# Patient Record
Sex: FEMALE | Race: ASIAN | NOT HISPANIC OR LATINO | Employment: OTHER | URBAN - METROPOLITAN AREA
[De-identification: names, ages, dates, MRNs, and addresses within clinical notes are randomized per-mention and may not be internally consistent; named-entity substitution may affect disease eponyms.]

---

## 2020-12-22 ENCOUNTER — OFFICE VISIT (OUTPATIENT)
Dept: URGENT CARE | Facility: URGENT CARE | Age: 75
End: 2020-12-22
Payer: MEDICAID

## 2020-12-22 VITALS
WEIGHT: 217 LBS | HEART RATE: 92 BPM | DIASTOLIC BLOOD PRESSURE: 78 MMHG | SYSTOLIC BLOOD PRESSURE: 121 MMHG | RESPIRATION RATE: 24 BRPM | TEMPERATURE: 98.1 F | OXYGEN SATURATION: 100 %

## 2020-12-22 DIAGNOSIS — E11.9 TYPE 2 DIABETES MELLITUS WITHOUT COMPLICATION, WITH LONG-TERM CURRENT USE OF INSULIN (H): ICD-10-CM

## 2020-12-22 DIAGNOSIS — Z79.4 TYPE 2 DIABETES MELLITUS WITHOUT COMPLICATION, WITH LONG-TERM CURRENT USE OF INSULIN (H): ICD-10-CM

## 2020-12-22 DIAGNOSIS — R73.9 ELEVATED BLOOD SUGAR: Primary | ICD-10-CM

## 2020-12-22 LAB — GLUCOSE BLD-MCNC: 279 MG/DL (ref 70–99)

## 2020-12-22 PROCEDURE — 99203 OFFICE O/P NEW LOW 30 MIN: CPT | Performed by: NURSE PRACTITIONER

## 2020-12-22 PROCEDURE — 82947 ASSAY GLUCOSE BLOOD QUANT: CPT | Performed by: NURSE PRACTITIONER

## 2020-12-22 PROCEDURE — 36415 COLL VENOUS BLD VENIPUNCTURE: CPT | Performed by: NURSE PRACTITIONER

## 2020-12-22 RX ORDER — INSULIN GLARGINE 100 [IU]/ML
35 INJECTION, SOLUTION SUBCUTANEOUS DAILY
Qty: 10.5 ML | Refills: 0 | Status: SHIPPED | OUTPATIENT
Start: 2020-12-22 | End: 2021-11-12

## 2020-12-22 RX ORDER — METOPROLOL TARTRATE 50 MG
75 TABLET ORAL 2 TIMES DAILY
COMMUNITY

## 2020-12-22 ASSESSMENT — ENCOUNTER SYMPTOMS
FEVER: 0
SORE THROAT: 0
CHILLS: 0
ROS GI COMMENTS: ELEVATED BLOOD SUGAR
SHORTNESS OF BREATH: 0
DIARRHEA: 0
NAUSEA: 0
VOMITING: 0
RHINORRHEA: 0
HEADACHES: 0
COUGH: 0

## 2020-12-23 NOTE — PROGRESS NOTES
SUBJECTIVE:   Jordan Rosario is a 74 year old female presenting with a chief complaint of   Chief Complaint   Patient presents with     Diabetes     High blood sugar levels, reading of 260 this morning around 9 am. Little dizziiness.       She is a new patient of [facility].    HPI  Jordan Rosario is a 74-year-old female presenting to urgent care with complains of elevated blood sugar levels this morning.  The granddaughter is here with her and reports that in the morning the blood sugar was 260.  She does not know of medications that the patient is using.  She has declined any symptoms.      I am not able to obtain any medical information and the granddaughter does not know where Jordan Rosario goes for treatment.    Review of Systems   Constitutional: Negative for chills and fever.   HENT: Negative for congestion, ear pain, rhinorrhea and sore throat.    Respiratory: Negative for cough and shortness of breath.    Gastrointestinal: Negative for diarrhea, nausea and vomiting.        Elevated blood sugar   Neurological: Negative for headaches.   All other systems reviewed and are negative.      No past medical history on file.  No family history on file.  Current Outpatient Medications   Medication Sig Dispense Refill     insulin glargine (BASAGLAR KWIKPEN) 100 UNIT/ML pen Inject 35 Units Subcutaneous daily 10.5 mL 0     metoprolol tartrate (LOPRESSOR) 50 MG tablet Take 50 mg by mouth 2 times daily       Social History     Tobacco Use     Smoking status: Never Smoker     Smokeless tobacco: Never Used   Substance Use Topics     Alcohol use: Not on file       OBJECTIVE  /78 (BP Location: Left arm, Patient Position: Sitting, Cuff Size: Adult Regular)   Pulse 92   Temp 98.1  F (36.7  C) (Tympanic)   Resp 24   Wt 98.4 kg (217 lb)   SpO2 100%   Breastfeeding No     Physical Exam  HENT:      Right Ear: Tympanic membrane normal.      Left Ear: Tympanic membrane normal.   Eyes:      Conjunctiva/sclera: Conjunctivae normal.    Cardiovascular:      Rate and Rhythm: Normal rate.   Pulmonary:      Effort: Pulmonary effort is normal.   Abdominal:      General: Bowel sounds are normal.      Palpations: Abdomen is soft.   Skin:     General: Skin is warm.   Neurological:      General: No focal deficit present.      Mental Status: She is alert.   Psychiatric:         Mood and Affect: Mood normal.         Labs:  Results for orders placed or performed in visit on 12/22/20 (from the past 24 hour(s))   Glucose, whole blood   Result Value Ref Range    Glucose Whole Blood 279 (H) 70 - 99 mg/dL         ASSESSMENT:      ICD-10-CM    1. Elevated blood sugar  R73.9 Glucose, whole blood     insulin glargine (BASAGLAR KWIKPEN) 100 UNIT/ML pen   2. Type 2 diabetes mellitus without complication, with long-term current use of insulin (H)  E11.9     Z79.4         PLAN:  According to the daughter the patient takes insulin at home.  She does have a paper that has previous prescription of  insulin.  I have advised to take tonight and follow-up with PCP in the next day or to the ER.  Patient educational/instructional material provided including reasons for follow-up    The patient indicates understanding of these issues and agrees with the plan.        Patient Instructions       Patient Education   Diabetes ABCs  Work with Your Health Care Team to Manage Diabetes!     A1c (hemoglobin A1c test):  Check at least every 6 months.  Goal without diabetes: Less than 6%  Goal with diabetes: Less than 7%, but your doctor may have a different goal for you.  My Goal: ___________________   BG (blood glucose):  Goals without diabetes:  Before meals: 60 to 99 mg/dL  After meals (2 hours): under 140 mg/dL  Goals with diabetes:   Before meals: 80 to 130 mg/dL  After meals: (2 hours): under 180 mg/dL  My Goals:  Before meals: __________  After meals: ___________   Blood pressure:  Check at every doctor visit.   Goal: Less than 140/90    Cholesterol:   Check at least 1 time a  "year.  Goals for LDL (\"bad\" cholesterol):  With heart disease: Less than 70 mg/dL  Without heart disease: Less than 100 mg/dL   Eyes:   Have a dilated eye exam every year.   Teeth:   See your dentist twice a year. People with diabetes have a higher risk of gum disease.  Smoking:   If you smoke, it's important to quit. Make a plan with help from your health care team.  Weight:   Work towards a healthy weight with help from your diabetes educator or dietitian.  Kidneys:     Check your urine protein 1 time each year.    Protect your kidneys by keeping your blood pressure normal.  Feet:    Check your feet every day for signs of injury, dry skin, cracks, cuts, swelling, or blisters.    Ask your doctor to check your feet at every visit.    Wear shoes and socks that fit well.    Don't go barefoot.  Sex:   Talk about erectile dysfunction (ED) and female sexual dysfunction (FSD) with your doctor.  For informational purposes only. Not to replace the advice of your health care provider. Copyright   2018 [employer]. All rights reserved. Illustration ID [id number]   [id number]  [employer]. Clinically reviewed by [facility]. [employer] Mobile [id number] - 10/18.       Patient Education   About Insulin Pens and Vials  Insulin vials  You need syringes to inject the insulin. Use a new syringe for each injection. Ask your doctor about the right size syringe for your dose.  Storing vials    Once you start using a vial of insulin, store it at room temperature away from direct light.    After opening, you can only use the vial for a certain amount of time. Refer to the handout for your type of insulin for storage guidelines. Throw the vial away when it passes the storage date, even if there is insulin left in the vial.    Keep unopened vials in the refrigerator. You may use them until the expiration date on the box (also known as the \"use by\" or \"discard\" date).  Your _________________________ insulin is good for " "_____________ days.  Your _________________________ insulin is good for _____________ days.  Your _________________________ insulin is good for _____________ days.   Insulin pens  Some pens come pre-filled with insulin. You may throw these away when the insulin expires or when you run out. Other pens use an insulin cartridge. You may throw away the cartridge, but not the pen.  Pens are made to dial an exact dose of insulin.  You need pen needles to inject the insulin. Use a new needle for each injection.  Storing pens    Once you start using an insulin pen, you may store it at room temperature.    Do not store an insulin pen with a pen needle attached.    Keep unopened pens in the refrigerator. You may use them until the expiration date on the box (also known as the \"use by\" or \"discard\" date).  Disposal  You may throw vials, cartridges and disposable pens in the trash. Throw pen needles and syringes in your sharps container. Your sharps container should:    Seal tightly and stand up straight without tipping.    Hold objects without leaking, breaking, cracking or letting the sharps push through.    Be clearly labeled and easy to dispose. (Call your garbage company for information on disposal.)  For informational purposes only. Not to replace the advice of your health care provider.  Copyright   2007 [employer] [facility]. All rights reserved. KIHEITAI [id number] - REV 03/17.             "

## 2020-12-23 NOTE — PATIENT INSTRUCTIONS
"  Patient Education   Diabetes ABCs  Work with Your Health Care Team to Manage Diabetes!     A1c (hemoglobin A1c test):  Check at least every 6 months.  Goal without diabetes: Less than 6%  Goal with diabetes: Less than 7%, but your doctor may have a different goal for you.  My Goal: ___________________   BG (blood glucose):  Goals without diabetes:  Before meals: 60 to 99 mg/dL  After meals (2 hours): under 140 mg/dL  Goals with diabetes:   Before meals: 80 to 130 mg/dL  After meals: (2 hours): under 180 mg/dL  My Goals:  Before meals: __________  After meals: ___________   Blood pressure:  Check at every doctor visit.   Goal: Less than 140/90    Cholesterol:   Check at least 1 time a year.  Goals for LDL (\"bad\" cholesterol):  With heart disease: Less than 70 mg/dL  Without heart disease: Less than 100 mg/dL   Eyes:   Have a dilated eye exam every year.   Teeth:   See your dentist twice a year. People with diabetes have a higher risk of gum disease.  Smoking:   If you smoke, it's important to quit. Make a plan with help from your health care team.  Weight:   Work towards a healthy weight with help from your diabetes educator or dietitian.  Kidneys:     Check your urine protein 1 time each year.    Protect your kidneys by keeping your blood pressure normal.  Feet:    Check your feet every day for signs of injury, dry skin, cracks, cuts, swelling, or blisters.    Ask your doctor to check your feet at every visit.    Wear shoes and socks that fit well.    Don't go barefoot.  Sex:   Talk about erectile dysfunction (ED) and female sexual dysfunction (FSD) with your doctor.  For informational purposes only. Not to replace the advice of your health care provider. Copyright   2018 [employer]. All rights reserved. Illustration ID [id number]   [id number]  [employer]. Clinically reviewed by [facility]. [employer] [id number] - 10/18.       Patient Education   About Insulin Pens and Vials  Insulin " "vials  You need syringes to inject the insulin. Use a new syringe for each injection. Ask your doctor about the right size syringe for your dose.  Storing vials    Once you start using a vial of insulin, store it at room temperature away from direct light.    After opening, you can only use the vial for a certain amount of time. Refer to the handout for your type of insulin for storage guidelines. Throw the vial away when it passes the storage date, even if there is insulin left in the vial.    Keep unopened vials in the refrigerator. You may use them until the expiration date on the box (also known as the \"use by\" or \"discard\" date).  Your _________________________ insulin is good for _____________ days.  Your _________________________ insulin is good for _____________ days.  Your _________________________ insulin is good for _____________ days.   Insulin pens  Some pens come pre-filled with insulin. You may throw these away when the insulin expires or when you run out. Other pens use an insulin cartridge. You may throw away the cartridge, but not the pen.  Pens are made to dial an exact dose of insulin.  You need pen needles to inject the insulin. Use a new needle for each injection.  Storing pens    Once you start using an insulin pen, you may store it at room temperature.    Do not store an insulin pen with a pen needle attached.    Keep unopened pens in the refrigerator. You may use them until the expiration date on the box (also known as the \"use by\" or \"discard\" date).  Disposal  You may throw vials, cartridges and disposable pens in the trash. Throw pen needles and syringes in your sharps container. Your sharps container should:    Seal tightly and stand up straight without tipping.    Hold objects without leaking, breaking, cracking or letting the sharps push through.    Be clearly labeled and easy to dispose. (Call your garbage company for information on disposal.)  For informational purposes only. Not to " replace the advice of your health care provider.  Copyright   2007 [employer] [facility]. All rights reserved. [employer] [id number] - REV 03/17.

## 2021-04-29 ENCOUNTER — RECORDS - HEALTHEAST (OUTPATIENT)
Dept: SCHEDULING | Facility: CLINIC | Age: 76
End: 2021-04-29

## 2021-04-29 ENCOUNTER — RECORDS - HEALTHEAST (OUTPATIENT)
Dept: ADMINISTRATIVE | Facility: OTHER | Age: 76
End: 2021-04-29

## 2021-04-29 DIAGNOSIS — Z91.89 OTHER SPECIFIED PERSONAL RISK FACTORS, NOT ELSEWHERE CLASSIFIED: ICD-10-CM

## 2021-04-29 DIAGNOSIS — Z91.89 AT RISK FOR ASPIRATION: ICD-10-CM

## 2021-09-27 ENCOUNTER — OFFICE VISIT (OUTPATIENT)
Dept: CARDIOLOGY | Facility: CLINIC | Age: 76
End: 2021-09-27
Payer: COMMERCIAL

## 2021-09-27 ENCOUNTER — APPOINTMENT (OUTPATIENT)
Dept: INTERPRETER SERVICES | Facility: CLINIC | Age: 76
End: 2021-09-27
Payer: COMMERCIAL

## 2021-09-27 VITALS — RESPIRATION RATE: 28 BRPM | HEART RATE: 94 BPM | DIASTOLIC BLOOD PRESSURE: 90 MMHG | SYSTOLIC BLOOD PRESSURE: 150 MMHG

## 2021-09-27 DIAGNOSIS — I25.10 CORONARY ARTERY DISEASE INVOLVING NATIVE CORONARY ARTERY OF NATIVE HEART, ANGINA PRESENCE UNSPECIFIED: ICD-10-CM

## 2021-09-27 DIAGNOSIS — Z95.0 CARDIAC PACEMAKER IN SITU: ICD-10-CM

## 2021-09-27 DIAGNOSIS — I50.22 CHRONIC SYSTOLIC HEART FAILURE (H): Primary | ICD-10-CM

## 2021-09-27 DIAGNOSIS — N18.32 STAGE 3B CHRONIC KIDNEY DISEASE (H): ICD-10-CM

## 2021-09-27 DIAGNOSIS — I48.20 CHRONIC ATRIAL FIBRILLATION (H): ICD-10-CM

## 2021-09-27 DIAGNOSIS — R06.09 DOE (DYSPNEA ON EXERTION): ICD-10-CM

## 2021-09-27 LAB
ANION GAP SERPL CALCULATED.3IONS-SCNC: 13 MMOL/L (ref 5–18)
BUN SERPL-MCNC: 43 MG/DL (ref 8–28)
CALCIUM SERPL-MCNC: 10.1 MG/DL (ref 8.5–10.5)
CHLORIDE BLD-SCNC: 94 MMOL/L (ref 98–107)
CO2 SERPL-SCNC: 22 MMOL/L (ref 22–31)
CREAT SERPL-MCNC: 1.48 MG/DL (ref 0.6–1.1)
GFR SERPL CREATININE-BSD FRML MDRD: 34 ML/MIN/1.73M2
GLUCOSE BLD-MCNC: 246 MG/DL (ref 70–125)
POTASSIUM BLD-SCNC: 3.9 MMOL/L (ref 3.5–5)
SODIUM SERPL-SCNC: 129 MMOL/L (ref 136–145)

## 2021-09-27 PROCEDURE — 80048 BASIC METABOLIC PNL TOTAL CA: CPT | Performed by: INTERNAL MEDICINE

## 2021-09-27 PROCEDURE — 83880 ASSAY OF NATRIURETIC PEPTIDE: CPT | Performed by: INTERNAL MEDICINE

## 2021-09-27 PROCEDURE — 99205 OFFICE O/P NEW HI 60 MIN: CPT | Performed by: INTERNAL MEDICINE

## 2021-09-27 PROCEDURE — 36415 COLL VENOUS BLD VENIPUNCTURE: CPT | Performed by: INTERNAL MEDICINE

## 2021-09-27 RX ORDER — INSULIN ASPART 100 [IU]/ML
INJECTION, SOLUTION INTRAVENOUS; SUBCUTANEOUS DAILY
COMMUNITY
Start: 2021-04-22

## 2021-09-27 RX ORDER — ACETAMINOPHEN 500 MG
TABLET ORAL DAILY PRN
COMMUNITY

## 2021-09-27 RX ORDER — BUMETANIDE 1 MG/1
1 TABLET ORAL DAILY
COMMUNITY

## 2021-09-27 RX ORDER — PREDNISONE 10 MG/1
TABLET ORAL DAILY
COMMUNITY
Start: 2021-09-03

## 2021-09-27 RX ORDER — WARFARIN SODIUM 2.5 MG/1
TABLET ORAL DAILY
COMMUNITY
Start: 2021-07-16

## 2021-09-27 RX ORDER — GLIPIZIDE 5 MG/1
5 TABLET ORAL DAILY
COMMUNITY
Start: 2021-07-15

## 2021-09-27 NOTE — PATIENT INSTRUCTIONS
1. Resume bumetanide 1 mg daily to keep fluid levels down.  2. Increase metoprolol tartrate to 1.5 tablets twice daily.  3. Check labs today  4. Set up pacemaker check

## 2021-09-27 NOTE — PROGRESS NOTES
Thank you, Dr. Pascual Kolb, for asking the [facility] team to see Ms. Jordan Rosario in the rapid access clinic to evaluate pacemaker.    Assessment/Recommendations   Assessment:    1.  Pacemaker in situ, details unknown but according to her son who she is now living with, it was implanted approximately 8 years ago in [place].  She does not carry a card that identifies the maker of the device.  He will try to get that information from his brother with whom she lived with in [place].  In the meantime, we will plan to set up a pacemaker check in our device clinic.  2.  Chronic systolic heart failure, likely secondary to underlying coronary artery disease.  Patient's son reports she had a myocardial infarction years ago but does not recall that she underwent stent placement or bypass surgery.  He does know that her heart function is weak.  She did have an echocardiogram performed in the last several months demonstrating moderate left ventricular systolic dysfunction with an ejection fraction of approximately 35% which appeared global along with mitral insufficiency.  Denies any current symptoms of chest tightness.  Does report exertional dyspnea.  Will check BNP and renal function.  I also note that she is not on bumetanide 1 mg daily which was on the medication list by her primary earlier this month.  Reiterated the importance that they restart this medication to help minimize fluid buildup in her lungs.  3.  Chronic atrial fibrillation with mildly rapid ventricular response.  Again in reviewing her medications, I note she is taking only 50 mg of metoprolol twice daily rather than the 75 mg twice daily.  Instructed her son to increase her medication appropriately.  She was restarted on warfarin anticoagulation by primary physician and will continue to follow there for her INRs.  4.  Coronary artery disease.  Again no formal records available but according to her son, she did sustain a heart  attack about 8 years ago which left her heart week.  Denies any current symptoms of chest tightness.    Plan:  1.  Restart bumetanide 1 mg daily as previously directed  2.  Increase metoprolol tartrate to 75 mg or 1.5 tablets twice daily  3.  Schedule visit in the pacemaker clinic.  We will attempt to get records of her pacemaker brand.  4.  Laboratory studies today including BNP and basic metabolic profile       History of Present Illness    Ms. Jordan Rosario is a 75 year old female with reported history of coronary artery disease, chronic left ventricular systolic dysfunction, chronic atrial fibrillation, status post permanent pacemaker insertion for unclear reasons but suspect bradycardia who is establishing cardiac care here in [place].  She had previously resided with the son in [place] and is now living with a son in [place].  According to her son here, his brother moved around quite a bit over the last 10 years and he does not know where his mother had her cardiac care or her pacemaker implanted.  He states she does not carry a card with the name of the pacemaker company and he has no knowledge of what type of pacemaker it is.  He states that she had a heart attack about 8 years ago that left her heart weak and thus the reason for her pacemaker.  It is not clear whether she has a pacemaker or AICD although she has never received any shocks from the device.  She was hospitalized in the spring for mild heart failure exacerbation due to a urinary tract infection.  Was transferred to a transitional care facility and then home.  When she was seen by her primary physician earlier this month, it appeared she was not taking her Bumex or her warfarin.  Both were restarted although the son never picked up the prescription for the Bumex.  He does report that his mother gets short of breath with minimal activity.  Also appears short of breath when laying down in bed.  No clear history of PND or lower extremity  edema.    ECG (personally reviewed): No ECG today    Cardiac Imaging Studies (personally reviewed): Recent echocardiogram demonstrated moderate to moderately severe global left ventricular dysfunction with estimated ejection fraction of 30 to 35%, severe left atrial enlargement, moderate to severe mitral insufficiency which is directed posteriorly, right ventricular enlargement with moderately reduced systolic function and severe tricuspid insufficiency with estimated pulmonary artery pressures of 29 mmHg plus mean right atrial pressure.       Physical Examination Review of Systems   BP (!) 150/90 (BP Location: Right arm, Patient Position: Sitting, Cuff Size: Adult Large)   Pulse 94   Resp 28   There is no height or weight on file to calculate BMI.  Wt Readings from Last 3 Encounters:   12/22/20 98.4 kg (217 lb)     General Appearance:   Awake, Alert, No acute distress.   HEENT:  No scleral icterus; the mucous membranes were pink and moist.   Neck: No cervical bruits or jugular venous distention    Chest: The spine was straight. The chest was symmetric.   Lungs:   Respirations unlabored; the lungs are clear to auscultation although slightly diminished at the right base. No wheezing or rhonchi   Cardiovascular:    Irregularly irregular rhythm.  S1, S2 normal.  2/6 holosystolic murmur heard at the lower left sternal border and into the axilla   Abdomen:  No organomegaly, masses, bruits, or tenderness. Bowels sounds are present   Extremities:  No peripheral edema bilaterally.   Skin: No xanthelasma. Warm, Dry.   Musculoskeletal: No tenderness.   Neurologic: Mood and affect are appropriate.    Enc Vitals  BP: (!) 150/90  Pulse: 94  Resp: 28  Weight:  (unable to obtain due to patient not being unsteady on her fe)                                         Medical History  Surgical History Family History Social History   No past medical history on file. No past surgical history on file. No family history on file. Social  History     Socioeconomic History     Marital status:      Spouse name: Not on file     Number of children: Not on file     Years of education: Not on file     Highest education level: Not on file   Occupational History     Not on file   Tobacco Use     Smoking status: Never Smoker     Smokeless tobacco: Never Used   Substance and Sexual Activity     Alcohol use: Not on file     Drug use: Not on file     Sexual activity: Not on file   Other Topics Concern     Not on file   Social History Narrative     Not on file     Social Determinants of Health     Financial Resource Strain:      Difficulty of Paying Living Expenses:    Food Insecurity:      Worried About Running Out of Food in the Last Year:      Ran Out of Food in the Last Year:    Transportation Needs:      Lack of Transportation (Medical):      Lack of Transportation (Non-Medical):    Physical Activity:      Days of Exercise per Week:      Minutes of Exercise per Session:    Stress:      Feeling of Stress :    Social Connections:      Frequency of Communication with Friends and Family:      Frequency of Social Gatherings with Friends and Family:      Attends Temple Services:      Active Member of Clubs or Organizations:      Attends Club or Organization Meetings:      Marital Status:    Intimate Partner Violence:      Fear of Current or Ex-Partner:      Emotionally Abused:      Physically Abused:      Sexually Abused:           Medications  Allergies   Current Outpatient Medications   Medication Sig Dispense Refill     acetaminophen (TYLENOL) 500 MG tablet daily as needed       bumetanide (BUMEX) 1 MG tablet Take 1 mg by mouth daily       diclofenac (VOLTAREN) 1 % topical gel daily as needed       glipiZIDE (GLUCOTROL) 5 MG tablet daily       metFORMIN (GLUCOPHAGE) 1000 MG tablet daily (with breakfast)       metoprolol tartrate (LOPRESSOR) 50 MG tablet Take 75 mg by mouth 2 times daily Take 1.5 tablets twice daily       NOVOLOG FLEXPEN 100 UNIT/ML  soln daily       predniSONE (DELTASONE) 10 MG tablet daily       warfarin ANTICOAGULANT (COUMADIN) 2.5 MG tablet daily       insulin glargine (BASAGLAR KWIKPEN) 100 UNIT/ML pen Inject 35 Units Subcutaneous daily 10.5 mL 0      No Known Allergies      Lab Results    Chemistry/lipid CBC Cardiac Enzymes/BNP/TSH/INR   No results for input(s): TRIG, LDL, CREATININE, BUN, NA, CO2 in the last 66877 hours.    Invalid input(s): TOTALCHOL, CHOLHDL, LDLCALC, K, CL No results for input(s): WBC, HGB, HCT, MCV, PLT in the last 08495 hours. No results for input(s): CKTOTAL, CKMB, TROPONINI, BNP, TSH, INR in the last 23853 hours.    Invalid input(s): CKMBINDEX     A total of 75 minutes was spent reviewing patient's medical records, obtaining history and performing examination, as well as discussing diagnoses/ recommendations with patient and answering all questions.

## 2021-09-27 NOTE — LETTER
9/27/2021    DIANE KOLB MD  [facility] [address]  [facility] [address]    RE: Jordan Rosario       Dear Colleague,    I had the pleasure of seeing Jordan Rosario in the [facility].        Thank you, Dr. Diane Kolb, for asking the [facility] team to see Ms. Jordan Rosario in the rapid access clinic to evaluate pacemaker.    Assessment/Recommendations   Assessment:    1.  Pacemaker in situ, details unknown but according to her son who she is now living with, it was implanted approximately 8 years ago in [place].  She does not carry a card that identifies the maker of the device.  He will try to get that information from his brother with whom she lived with in [place].  In the meantime, we will plan to set up a pacemaker check in our device clinic.  2.  Chronic systolic heart failure, likely secondary to underlying coronary artery disease.  Patient's son reports she had a myocardial infarction years ago but does not recall that she underwent stent placement or bypass surgery.  He does know that her heart function is weak.  She did have an echocardiogram performed in the last several months demonstrating moderate left ventricular systolic dysfunction with an ejection fraction of approximately 35% which appeared global along with mitral insufficiency.  Denies any current symptoms of chest tightness.  Does report exertional dyspnea.  Will check BNP and renal function.  I also note that she is not on bumetanide 1 mg daily which was on the medication list by her primary earlier this month.  Reiterated the importance that they restart this medication to help minimize fluid buildup in her lungs.  3.  Chronic atrial fibrillation with mildly rapid ventricular response.  Again in reviewing her medications, I note she is taking only 50 mg of metoprolol twice daily rather than the 75 mg twice daily.  Instructed her son to increase her medication  appropriately.  She was restarted on warfarin anticoagulation by primary physician and will continue to follow there for her INRs.  4.  Coronary artery disease.  Again no formal records available but according to her son, she did sustain a heart attack about 8 years ago which left her heart week.  Denies any current symptoms of chest tightness.    Plan:  1.  Restart bumetanide 1 mg daily as previously directed  2.  Increase metoprolol tartrate to 75 mg or 1.5 tablets twice daily  3.  Schedule visit in the pacemaker clinic.  We will attempt to get records of her pacemaker brand.  4.  Laboratory studies today including BNP and basic metabolic profile       History of Present Illness    Ms. Jordan Rosario is a 75 year old female with reported history of coronary artery disease, chronic left ventricular systolic dysfunction, chronic atrial fibrillation, status post permanent pacemaker insertion for unclear reasons but suspect bradycardia who is establishing cardiac care here in [place].  She had previously resided with the son in [place] and is now living with a son in [place].  According to her son here, his brother moved around quite a bit over the last 10 years and he does not know where his mother had her cardiac care or her pacemaker implanted.  He states she does not carry a card with the name of the pacemaker company and he has no knowledge of what type of pacemaker it is.  He states that she had a heart attack about 8 years ago that left her heart weak and thus the reason for her pacemaker.  It is not clear whether she has a pacemaker or AICD although she has never received any shocks from the device.  She was hospitalized in the spring for mild heart failure exacerbation due to a urinary tract infection.  Was transferred to a transitional care facility and then home.  When she was seen by her primary physician earlier this month, it appeared she was not taking her Bumex or her warfarin.  Both were restarted  although the son never picked up the prescription for the Bumex.  He does report that his mother gets short of breath with minimal activity.  Also appears short of breath when laying down in bed.  No clear history of PND or lower extremity edema.    ECG (personally reviewed): No ECG today    Cardiac Imaging Studies (personally reviewed): Recent echocardiogram demonstrated moderate to moderately severe global left ventricular dysfunction with estimated ejection fraction of 30 to 35%, severe left atrial enlargement, moderate to severe mitral insufficiency which is directed posteriorly, right ventricular enlargement with moderately reduced systolic function and severe tricuspid insufficiency with estimated pulmonary artery pressures of 29 mmHg plus mean right atrial pressure.       Physical Examination Review of Systems   BP (!) 150/90 (BP Location: Right arm, Patient Position: Sitting, Cuff Size: Adult Large)   Pulse 94   Resp 28   There is no height or weight on file to calculate BMI.  Wt Readings from Last 3 Encounters:   12/22/20 98.4 kg (217 lb)     General Appearance:   Awake, Alert, No acute distress.   HEENT:  No scleral icterus; the mucous membranes were pink and moist.   Neck: No cervical bruits or jugular venous distention    Chest: The spine was straight. The chest was symmetric.   Lungs:   Respirations unlabored; the lungs are clear to auscultation although slightly diminished at the right base. No wheezing or rhonchi   Cardiovascular:    Irregularly irregular rhythm.  S1, S2 normal.  2/6 holosystolic murmur heard at the lower left sternal border and into the axilla   Abdomen:  No organomegaly, masses, bruits, or tenderness. Bowels sounds are present   Extremities:  No peripheral edema bilaterally.   Skin: No xanthelasma. Warm, Dry.   Musculoskeletal: No tenderness.   Neurologic: Mood and affect are appropriate.    Enc Vitals  BP: (!) 150/90  Pulse: 94  Resp: 28  Weight:  (unable to obtain due to patient  not being unsteady on her fe)                                         Medical History  Surgical History Family History Social History   No past medical history on file. No past surgical history on file. No family history on file. Social History     Socioeconomic History     Marital status:      Spouse name: Not on file     Number of children: Not on file     Years of education: Not on file     Highest education level: Not on file   Occupational History     Not on file   Tobacco Use     Smoking status: Never Smoker     Smokeless tobacco: Never Used   Substance and Sexual Activity     Alcohol use: Not on file     Drug use: Not on file     Sexual activity: Not on file   Other Topics Concern     Not on file   Social History Narrative     Not on file     Social Determinants of Health     Financial Resource Strain:      Difficulty of Paying Living Expenses:    Food Insecurity:      Worried About Running Out of Food in the Last Year:      Ran Out of Food in the Last Year:    Transportation Needs:      Lack of Transportation (Medical):      Lack of Transportation (Non-Medical):    Physical Activity:      Days of Exercise per Week:      Minutes of Exercise per Session:    Stress:      Feeling of Stress :    Social Connections:      Frequency of Communication with Friends and Family:      Frequency of Social Gatherings with Friends and Family:      Attends Hinduism Services:      Active Member of Clubs or Organizations:      Attends Club or Organization Meetings:      Marital Status:    Intimate Partner Violence:      Fear of Current or Ex-Partner:      Emotionally Abused:      Physically Abused:      Sexually Abused:           Medications  Allergies   Current Outpatient Medications   Medication Sig Dispense Refill     acetaminophen (TYLENOL) 500 MG tablet daily as needed       bumetanide (BUMEX) 1 MG tablet Take 1 mg by mouth daily       diclofenac (VOLTAREN) 1 % topical gel daily as needed       glipiZIDE  (GLUCOTROL) 5 MG tablet daily       metFORMIN (GLUCOPHAGE) 1000 MG tablet daily (with breakfast)       metoprolol tartrate (LOPRESSOR) 50 MG tablet Take 75 mg by mouth 2 times daily Take 1.5 tablets twice daily       NOVOLOG FLEXPEN 100 UNIT/ML soln daily       predniSONE (DELTASONE) 10 MG tablet daily       warfarin ANTICOAGULANT (COUMADIN) 2.5 MG tablet daily       insulin glargine (BASAGLAR KWIKPEN) 100 UNIT/ML pen Inject 35 Units Subcutaneous daily 10.5 mL 0      No Known Allergies      Lab Results    Chemistry/lipid CBC Cardiac Enzymes/BNP/TSH/INR   No results for input(s): TRIG, LDL, CREATININE, BUN, NA, CO2 in the last 05682 hours.    Invalid input(s): TOTALCHOL, CHOLHDL, LDLCALC, K, CL No results for input(s): WBC, HGB, HCT, MCV, PLT in the last 25367 hours. No results for input(s): CKTOTAL, CKMB, TROPONINI, BNP, TSH, INR in the last 16728 hours.    Invalid input(s): CKMBINDEX     A total of 75 minutes was spent reviewing patient's medical records, obtaining history and performing examination, as well as discussing diagnoses/ recommendations with patient and answering all questions.                          Thank you for allowing me to participate in the care of your patient.      Sincerely,     Patricia Bobo MD     [facility]  cc:   Pascual Kolb MD  [facility] [place] [place] [facility]  [address] [place] [address]

## 2021-09-29 LAB — NT-PROBNP SERPL-MCNC: 9514 PG/ML (ref 0–450)

## 2021-10-01 ENCOUNTER — ANCILLARY PROCEDURE (OUTPATIENT)
Dept: CARDIOLOGY | Facility: CLINIC | Age: 76
End: 2021-10-01
Attending: INTERNAL MEDICINE
Payer: COMMERCIAL

## 2021-10-01 VITALS
DIASTOLIC BLOOD PRESSURE: 62 MMHG | SYSTOLIC BLOOD PRESSURE: 124 MMHG | RESPIRATION RATE: 16 BRPM | WEIGHT: 119 LBS | HEART RATE: 84 BPM

## 2021-10-01 DIAGNOSIS — Z95.0 PACEMAKER: ICD-10-CM

## 2021-10-01 LAB
MDC_IDC_LEAD_IMPLANT_DT: NORMAL
MDC_IDC_LEAD_IMPLANT_DT: NORMAL
MDC_IDC_LEAD_LOCATION: NORMAL
MDC_IDC_LEAD_LOCATION: NORMAL
MDC_IDC_LEAD_LOCATION_DETAIL_1: NORMAL
MDC_IDC_LEAD_LOCATION_DETAIL_1: NORMAL
MDC_IDC_LEAD_MFG: NORMAL
MDC_IDC_LEAD_MFG: NORMAL
MDC_IDC_LEAD_MODEL: NORMAL
MDC_IDC_LEAD_MODEL: NORMAL
MDC_IDC_LEAD_POLARITY_TYPE: NORMAL
MDC_IDC_LEAD_POLARITY_TYPE: NORMAL
MDC_IDC_LEAD_SERIAL: NORMAL
MDC_IDC_LEAD_SERIAL: NORMAL
MDC_IDC_MSMT_BATTERY_DTM: NORMAL
MDC_IDC_MSMT_BATTERY_IMPEDANCE: 382 OHM
MDC_IDC_MSMT_BATTERY_REMAINING_LONGEVITY: 105 MO
MDC_IDC_MSMT_BATTERY_STATUS: NORMAL
MDC_IDC_MSMT_BATTERY_VOLTAGE: 2.77 V
MDC_IDC_MSMT_LEADCHNL_RA_IMPEDANCE_VALUE: 522 OHM
MDC_IDC_MSMT_LEADCHNL_RA_SENSING_INTR_AMPL: 0.7 MV
MDC_IDC_MSMT_LEADCHNL_RV_IMPEDANCE_VALUE: 457 OHM
MDC_IDC_MSMT_LEADCHNL_RV_PACING_THRESHOLD_AMPLITUDE: 0.62 V
MDC_IDC_MSMT_LEADCHNL_RV_PACING_THRESHOLD_AMPLITUDE: 0.75 V
MDC_IDC_MSMT_LEADCHNL_RV_PACING_THRESHOLD_PULSEWIDTH: 0.4 MS
MDC_IDC_MSMT_LEADCHNL_RV_PACING_THRESHOLD_PULSEWIDTH: 0.4 MS
MDC_IDC_MSMT_LEADCHNL_RV_SENSING_INTR_AMPL: 4 MV
MDC_IDC_PG_IMPLANT_DTM: NORMAL
MDC_IDC_PG_MFG: NORMAL
MDC_IDC_PG_MODEL: NORMAL
MDC_IDC_PG_SERIAL: NORMAL
MDC_IDC_PG_TYPE: NORMAL
MDC_IDC_SESS_CLINIC_NAME: NORMAL
MDC_IDC_SESS_DTM: NORMAL
MDC_IDC_SESS_TYPE: NORMAL
MDC_IDC_SET_BRADY_LOWRATE: 60 {BEATS}/MIN
MDC_IDC_SET_BRADY_MAX_SENSOR_RATE: 130 {BEATS}/MIN
MDC_IDC_SET_BRADY_MAX_TRACKING_RATE: 110 {BEATS}/MIN
MDC_IDC_SET_BRADY_MODE: NORMAL
MDC_IDC_SET_LEADCHNL_RV_PACING_AMPLITUDE: NORMAL
MDC_IDC_SET_LEADCHNL_RV_PACING_CAPTURE_MODE: NORMAL
MDC_IDC_SET_LEADCHNL_RV_PACING_POLARITY: NORMAL
MDC_IDC_SET_LEADCHNL_RV_PACING_PULSEWIDTH: 0.4 MS
MDC_IDC_SET_LEADCHNL_RV_SENSING_POLARITY: NORMAL
MDC_IDC_SET_LEADCHNL_RV_SENSING_SENSITIVITY: 1.4 MV
MDC_IDC_SET_ZONE_DETECTION_INTERVAL: 375 MS
MDC_IDC_SET_ZONE_TYPE: NORMAL
MDC_IDC_SET_ZONE_TYPE: NORMAL
MDC_IDC_STAT_AT_BURDEN_PERCENT: 22.3 %
MDC_IDC_STAT_AT_DTM_END: NORMAL
MDC_IDC_STAT_AT_DTM_START: NORMAL
MDC_IDC_STAT_AT_MODE_SW_COUNT: NORMAL
MDC_IDC_STAT_BRADY_DTM_END: NORMAL
MDC_IDC_STAT_BRADY_DTM_START: NORMAL
MDC_IDC_STAT_BRADY_RA_PERCENT_PACED: 0.1 %
MDC_IDC_STAT_BRADY_RV_PERCENT_PACED: 0.1 %
MDC_IDC_STAT_EPISODE_RECENT_COUNT: 161
MDC_IDC_STAT_EPISODE_RECENT_COUNT: NORMAL
MDC_IDC_STAT_EPISODE_RECENT_COUNT_DTM_END: NORMAL
MDC_IDC_STAT_EPISODE_RECENT_COUNT_DTM_END: NORMAL
MDC_IDC_STAT_EPISODE_RECENT_COUNT_DTM_START: NORMAL
MDC_IDC_STAT_EPISODE_RECENT_COUNT_DTM_START: NORMAL
MDC_IDC_STAT_EPISODE_TYPE: NORMAL
MDC_IDC_STAT_EPISODE_TYPE: NORMAL

## 2021-10-01 PROCEDURE — 93280 PM DEVICE PROGR EVAL DUAL: CPT | Performed by: INTERNAL MEDICINE

## 2021-10-01 NOTE — PATIENT INSTRUCTIONS
- Call [employer] Stay [employer]cted telephone number to have them order you a remote monitor for your pacemaker, Julio MONTENEGRO, Serial Number [id number].    - Please call the Device Clinic when you receive the remote monitor (Device Clinic telephone: [phone], Option 2) to ensure your home remote monitor is connecting correctly.    - We will set you up for a remote pacemaker transmission (from home) in January 2022 once we know you have a working remote monitor.

## 2021-10-08 ENCOUNTER — TELEPHONE (OUTPATIENT)
Dept: CARDIOLOGY | Facility: CLINIC | Age: 76
End: 2021-10-08

## 2021-10-08 ENCOUNTER — APPOINTMENT (OUTPATIENT)
Dept: CARDIOLOGY | Facility: CLINIC | Age: 76
End: 2021-10-08
Payer: COMMERCIAL

## 2021-10-08 ENCOUNTER — OFFICE VISIT (OUTPATIENT)
Dept: CARDIOLOGY | Facility: CLINIC | Age: 76
End: 2021-10-08
Payer: COMMERCIAL

## 2021-10-08 VITALS
SYSTOLIC BLOOD PRESSURE: 110 MMHG | DIASTOLIC BLOOD PRESSURE: 70 MMHG | HEART RATE: 80 BPM | RESPIRATION RATE: 28 BRPM | WEIGHT: 119.6 LBS

## 2021-10-08 DIAGNOSIS — E11.9 TYPE 2 DIABETES MELLITUS WITHOUT COMPLICATION, WITH LONG-TERM CURRENT USE OF INSULIN (H): ICD-10-CM

## 2021-10-08 DIAGNOSIS — Z95.0 CARDIAC PACEMAKER IN SITU: ICD-10-CM

## 2021-10-08 DIAGNOSIS — I25.10 CORONARY ARTERY DISEASE INVOLVING NATIVE CORONARY ARTERY OF NATIVE HEART WITHOUT ANGINA PECTORIS: ICD-10-CM

## 2021-10-08 DIAGNOSIS — I50.20 HEART FAILURE WITH REDUCED EJECTION FRACTION (H): ICD-10-CM

## 2021-10-08 DIAGNOSIS — Z79.4 TYPE 2 DIABETES MELLITUS WITHOUT COMPLICATION, WITH LONG-TERM CURRENT USE OF INSULIN (H): ICD-10-CM

## 2021-10-08 DIAGNOSIS — I48.11 LONGSTANDING PERSISTENT ATRIAL FIBRILLATION (H): ICD-10-CM

## 2021-10-08 LAB
ANION GAP SERPL CALCULATED.3IONS-SCNC: 11 MMOL/L (ref 5–18)
BUN SERPL-MCNC: 47 MG/DL (ref 8–28)
CALCIUM SERPL-MCNC: 9.8 MG/DL (ref 8.5–10.5)
CHLORIDE BLD-SCNC: 94 MMOL/L (ref 98–107)
CO2 SERPL-SCNC: 27 MMOL/L (ref 22–31)
CREAT SERPL-MCNC: 1.52 MG/DL (ref 0.6–1.1)
GFR SERPL CREATININE-BSD FRML MDRD: 33 ML/MIN/1.73M2
GLUCOSE BLD-MCNC: 207 MG/DL (ref 70–125)
NT-PROBNP SERPL-MCNC: 4906 PG/ML (ref 0–450)
POTASSIUM BLD-SCNC: 4.2 MMOL/L (ref 3.5–5)
SODIUM SERPL-SCNC: 132 MMOL/L (ref 136–145)

## 2021-10-08 PROCEDURE — 83880 ASSAY OF NATRIURETIC PEPTIDE: CPT | Performed by: NURSE PRACTITIONER

## 2021-10-08 PROCEDURE — 80048 BASIC METABOLIC PNL TOTAL CA: CPT | Performed by: NURSE PRACTITIONER

## 2021-10-08 PROCEDURE — 99214 OFFICE O/P EST MOD 30 MIN: CPT | Performed by: NURSE PRACTITIONER

## 2021-10-08 PROCEDURE — 36415 COLL VENOUS BLD VENIPUNCTURE: CPT | Performed by: NURSE PRACTITIONER

## 2021-10-08 NOTE — PROGRESS NOTES
Assessment/Recommendations   Assessment:    1.  Heart failure with reduced ejection fraction, LVEF 30 to 35%, NYHA class II: Her son states her dyspnea on exertion has improved with restarting her Bumex a few weeks ago.  We discussed monitoring symptoms, following a low-sodium diet and monitoring daily weights.  He met with the nurse clinician for further education.  He is wanting a prescription for a blood pressure cuff which I have provided.  2.  Persistent atrial fibrillation: Rate controlled.  She continues warfarin for anticoagulation.  Dr. Bobo increased her Metroprolol to 75 mg twice a day a few weeks ago.  3.  Diabetes mellitus type 2: Currently on Metformin, glipizide, Novolin and glargine. Hemoglobin A1C 7.2 in June    Plan:  1.  Continue current medications  2.  BMP and proBNP pending  3.  Start monitoring daily weights and low-sodium diet  4.  Prescription for blood pressure cuff given    Jordan Rosario will follow up with Dr. Bobo in March and in the heart failure clinic in 1 month.     History of Present Illness/Subjective    Ms. Jordan Rosario is a 75 year old female seen at [facility] heart failure clinic today for continued follow-up.  Her son accompanies her today.  There is also  telephone for the duration of the appointment.  She follows up for heart failure with reduced ejection fraction.  She had an echocardiogram June 2021 which showed ejection fraction 30 to 35%.  She has a past medical history significant for diabetes, CAD, persistent atrial fibrillation, and pacemaker in 2015.    During the last clinic visit, Dr. Bobo increased her metoprolol and restarted her Bumex.  Her dyspnea on exertion has improved per her son.  She denies lightheadedness, orthopnea, PND, palpitations, chest pain, abdominal fullness/bloating and lower extremity edema.      She does not have a scale at home.       Physical Examination Review of Systems   /70 (BP Location: Left arm, Patient  Position: Sitting, Cuff Size: Adult Regular)   Pulse 80   Resp 28   Wt 54.3 kg (119 lb 9.6 oz)   There is no height or weight on file to calculate BMI.  Wt Readings from Last 3 Encounters:   10/08/21 54.3 kg (119 lb 9.6 oz)   10/01/21 54 kg (119 lb)   12/22/20 98.4 kg (217 lb)       General Appearance:   no acute distress   ENT/Mouth: membranes moist, no oral lesions or bleeding gums.      EYES:  no scleral icterus, normal conjunctivae   Neck: no carotid bruits or thyromegaly   Chest/Lungs:   lungs are clear to auscultation, no rales or wheezing, equal chest wall expansion    Cardiovascular:    Irregularly irregular. Normal first and second heart sounds with no murmurs, rubs, or gallops; Jugular venous pressure normal, no edema bilaterally    Abdomen:  no organomegaly, masses, bruits, or tenderness; bowel sounds are present   Extremities: no cyanosis or clubbing   Skin: no xanthelasma, warm.    Neurologic: normal  bilateral, no tremors     Psychiatric: alert and oriented x3    Enc Vitals  BP: 110/70  Pulse: 80  Resp: 28  Weight: 54.3 kg (119 lb 9.6 oz)                                         Medical History  Surgical History Family History Social History   Past Medical History:   Diagnosis Date     Atrial fibrillation (H)      Congestive heart failure (H)      Coronary artery disease      Hyperlipidemia     Past Surgical History:   Procedure Laterality Date     IMPLANT PACEMAKER      History reviewed. No pertinent family history. Social History     Socioeconomic History     Marital status:      Spouse name: Not on file     Number of children: Not on file     Years of education: Not on file     Highest education level: Not on file   Occupational History     Not on file   Tobacco Use     Smoking status: Never Smoker     Smokeless tobacco: Never Used   Substance and Sexual Activity     Alcohol use: Not on file     Drug use: Not on file     Sexual activity: Not on file   Other Topics Concern      Parent/sibling w/ CABG, MI or angioplasty before 65F 55M? Not Asked   Social History Narrative     Not on file     Social Determinants of Health     Financial Resource Strain:      Difficulty of Paying Living Expenses:    Food Insecurity:      Worried About Running Out of Food in the Last Year:      Ran Out of Food in the Last Year:    Transportation Needs:      Lack of Transportation (Medical):      Lack of Transportation (Non-Medical):    Physical Activity:      Days of Exercise per Week:      Minutes of Exercise per Session:    Stress:      Feeling of Stress :    Social Connections:      Frequency of Communication with Friends and Family:      Frequency of Social Gatherings with Friends and Family:      Attends Scientologist Services:      Active Member of Clubs or Organizations:      Attends Club or Organization Meetings:      Marital Status:    Intimate Partner Violence:      Fear of Current or Ex-Partner:      Emotionally Abused:      Physically Abused:      Sexually Abused:           Medications  Allergies   Current Outpatient Medications   Medication Sig Dispense Refill     acetaminophen (TYLENOL) 500 MG tablet daily as needed       bumetanide (BUMEX) 1 MG tablet Take 1 mg by mouth daily       diclofenac (VOLTAREN) 1 % topical gel daily as needed       glipiZIDE (GLUCOTROL) 5 MG tablet Take 5 mg by mouth daily        insulin glargine (BASAGLAR KWIKPEN) 100 UNIT/ML pen Inject 35 Units Subcutaneous daily 10.5 mL 0     metFORMIN (GLUCOPHAGE) 1000 MG tablet Take 1,000 mg by mouth daily (with breakfast)        metoprolol tartrate (LOPRESSOR) 50 MG tablet Take 75 mg by mouth 2 times daily Take 1.5 tablets twice daily       NOVOLOG FLEXPEN 100 UNIT/ML soln daily       predniSONE (DELTASONE) 10 MG tablet daily       warfarin ANTICOAGULANT (COUMADIN) 2.5 MG tablet daily      No Known Allergies      Lab Results    Chemistry/lipid CBC Cardiac Enzymes/BNP/TSH/INR   Lab Results   Component Value Date    BUN 43 (H) 09/27/2021      (L) 09/27/2021    CO2 22 09/27/2021    No results found for: WBC, HGB, HCT, MCV, PLT No results found for: CKTOTAL, CKMB, TROPONINI, BNP, TSH, INR          This note has been dictated using voice recognition software. Any grammatical, typographical, or context distortions are unintentional and inherent to the software      30 minutes spent on the date of encounter doing chart review, review of outside records, review of test results, patient visit, documentation and discussion with family.

## 2021-10-08 NOTE — LETTER
10/8/2021    DIANE GUAMAN MD  [facility] [address]  [facility] [address]    RE: Jordan Rosario       Dear Colleague,    I had the pleasure of seeing Jordan Rosario in the [facility].          Assessment/Recommendations   Assessment:    1.  Heart failure with reduced ejection fraction, LVEF 30 to 35%, NYHA class II: Her son states her dyspnea on exertion has improved with restarting her Bumex a few weeks ago.  We discussed monitoring symptoms, following a low-sodium diet and monitoring daily weights.  He met with the nurse clinician for further education.  He is wanting a prescription for a blood pressure cuff which I have provided.  2.  Persistent atrial fibrillation: Rate controlled.  She continues warfarin for anticoagulation.  Dr. Bobo increased her Metroprolol to 75 mg twice a day a few weeks ago.  3.  Diabetes mellitus type 2: Currently on Metformin, glipizide, Novolin and glargine. Hemoglobin A1C 7.2 in June    Plan:  1.  Continue current medications  2.  BMP and proBNP pending  3.  Start monitoring daily weights and low-sodium diet  4.  Prescription for blood pressure cuff given    Jordan Rosario will follow up with Dr. Bobo in March and in the heart failure clinic in 1 month.     History of Present Illness/Subjective    Ms. Jordan Rosario is a 75 year old female seen at [facility] heart failure clinic today for continued follow-up.  Her son accompanies her today.  There is also  telephone for the duration of the appointment.  She follows up for heart failure with reduced ejection fraction.  She had an echocardiogram June 2021 which showed ejection fraction 30 to 35%.  She has a past medical history significant for diabetes, CAD, persistent atrial fibrillation, and pacemaker in 2015.    During the last clinic visit, Dr. Bobo increased her metoprolol and restarted her Bumex.  Her dyspnea on exertion has improved per her son.  She denies  lightheadedness, orthopnea, PND, palpitations, chest pain, abdominal fullness/bloating and lower extremity edema.      She does not have a scale at home.       Physical Examination Review of Systems   /70 (BP Location: Left arm, Patient Position: Sitting, Cuff Size: Adult Regular)   Pulse 80   Resp 28   Wt 54.3 kg (119 lb 9.6 oz)   There is no height or weight on file to calculate BMI.  Wt Readings from Last 3 Encounters:   10/08/21 54.3 kg (119 lb 9.6 oz)   10/01/21 54 kg (119 lb)   12/22/20 98.4 kg (217 lb)       General Appearance:   no acute distress   ENT/Mouth: membranes moist, no oral lesions or bleeding gums.      EYES:  no scleral icterus, normal conjunctivae   Neck: no carotid bruits or thyromegaly   Chest/Lungs:   lungs are clear to auscultation, no rales or wheezing, equal chest wall expansion    Cardiovascular:    Irregularly irregular. Normal first and second heart sounds with no murmurs, rubs, or gallops; Jugular venous pressure normal, no edema bilaterally    Abdomen:  no organomegaly, masses, bruits, or tenderness; bowel sounds are present   Extremities: no cyanosis or clubbing   Skin: no xanthelasma, warm.    Neurologic: normal  bilateral, no tremors     Psychiatric: alert and oriented x3    Enc Vitals  BP: 110/70  Pulse: 80  Resp: 28  Weight: 54.3 kg (119 lb 9.6 oz)                                         Medical History  Surgical History Family History Social History   Past Medical History:   Diagnosis Date     Atrial fibrillation (H)      Congestive heart failure (H)      Coronary artery disease      Hyperlipidemia     Past Surgical History:   Procedure Laterality Date     IMPLANT PACEMAKER      History reviewed. No pertinent family history. Social History     Socioeconomic History     Marital status:      Spouse name: Not on file     Number of children: Not on file     Years of education: Not on file     Highest education level: Not on file   Occupational History     Not on  file   Tobacco Use     Smoking status: Never Smoker     Smokeless tobacco: Never Used   Substance and Sexual Activity     Alcohol use: Not on file     Drug use: Not on file     Sexual activity: Not on file   Other Topics Concern     Parent/sibling w/ CABG, MI or angioplasty before 65F 55M? Not Asked   Social History Narrative     Not on file     Social Determinants of Health     Financial Resource Strain:      Difficulty of Paying Living Expenses:    Food Insecurity:      Worried About Running Out of Food in the Last Year:      Ran Out of Food in the Last Year:    Transportation Needs:      Lack of Transportation (Medical):      Lack of Transportation (Non-Medical):    Physical Activity:      Days of Exercise per Week:      Minutes of Exercise per Session:    Stress:      Feeling of Stress :    Social Connections:      Frequency of Communication with Friends and Family:      Frequency of Social Gatherings with Friends and Family:      Attends Yarsanism Services:      Active Member of Clubs or Organizations:      Attends Club or Organization Meetings:      Marital Status:    Intimate Partner Violence:      Fear of Current or Ex-Partner:      Emotionally Abused:      Physically Abused:      Sexually Abused:           Medications  Allergies   Current Outpatient Medications   Medication Sig Dispense Refill     acetaminophen (TYLENOL) 500 MG tablet daily as needed       bumetanide (BUMEX) 1 MG tablet Take 1 mg by mouth daily       diclofenac (VOLTAREN) 1 % topical gel daily as needed       glipiZIDE (GLUCOTROL) 5 MG tablet Take 5 mg by mouth daily        insulin glargine (BASAGLAR KWIKPEN) 100 UNIT/ML pen Inject 35 Units Subcutaneous daily 10.5 mL 0     metFORMIN (GLUCOPHAGE) 1000 MG tablet Take 1,000 mg by mouth daily (with breakfast)        metoprolol tartrate (LOPRESSOR) 50 MG tablet Take 75 mg by mouth 2 times daily Take 1.5 tablets twice daily       NOVOLOG FLEXPEN 100 UNIT/ML soln daily       predniSONE (DELTASONE)  10 MG tablet daily       warfarin ANTICOAGULANT (COUMADIN) 2.5 MG tablet daily      No Known Allergies      Lab Results    Chemistry/lipid CBC Cardiac Enzymes/BNP/TSH/INR   Lab Results   Component Value Date    BUN 43 (H) 09/27/2021     (L) 09/27/2021    CO2 22 09/27/2021    No results found for: WBC, HGB, HCT, MCV, PLT No results found for: CKTOTAL, CKMB, TROPONINI, BNP, TSH, INR          This note has been dictated using voice recognition software. Any grammatical, typographical, or context distortions are unintentional and inherent to the software      30 minutes spent on the date of encounter doing chart review, review of outside records, review of test results, patient visit, documentation and discussion with family.              Patient and her son were seen in clinic for HF education with the use of an .  Reviewed HF Binder that includes the  HF Sx Awareness & and Weight log booklet highlighting :  __X_patient s type of heart failure _X__Na management in diet  __X_importance of daily wts  _X__Fluid Guidelines, if applicable  __X_medication review and importance of compliance   The son speaks English and is very attentive to the needs of his mother. He was offered education on the low sodium diet and fluid daily expectations. He does not feel that they will have too much difficulty in managing this dietary and fluid restriction.   Instructed patient in signs and sx of heart failure, reiterated when to call clinic - reviewed HF hotline # [phone] and after hours call # [phone].  Majority of time was spent reviewing: Low sodium management at home.  Patient verbalized understanding of HF discussion.  Plan for f/u with continued HF education reviewed.  No formal f/u scheduled with nurse clinician for continued education - will continue to reinforce HF management education.    Ivelisse Wong RN BSN, CHFN                Thank you for allowing me to participate in the care of your  patient.      Sincerely,     DES Anderson CNP     [facility]  cc:   No referring provider defined for this encounter.

## 2021-10-08 NOTE — PATIENT INSTRUCTIONS
Jordan Rosario,    It was a pleasure to see you today at [facility].     My recommendations after this visit include:  - Please follow up with Dr Bobo in March  - Please follow up with Lesvia Brothers in 1 month  - I have checked labs and will contact you with results  - Continue current medications    Lesvia Brothers CNP      What is the [facility] Program?     The [facility] is a heart failure specialty clinic within Heart Care.  You will work with your cardiologist, nurse practitioner, and nurses to carefully adjust medications and learn how to live with heart failure.  The Heart Failure Program will help you:      Better understand your chronic heart condition    Feel better and avoid hospital stays    Monitoring for Symptoms      Call the Heart Failure Phone Line ([phone]) if you have any of these symptoms:     Increased shortness of breath/shortness of breath at rest    Waking up at night with difficulty breathing    Unable to lie down for sleep due to symptoms or needing to sit upright for sleep    Weight gain of 2 pounds a day for 2 days in a row OR 5 pounds in 1 week    Increased swelling in your ankles or legs    Dizziness or lightheadedness    Medications       Take your medications as prescribed    Bring all your medications in their original bottles to every appointment    Avoid non-steroidal anti-inflammatory medications (Advil, Aleve, Ibuprofen, Naprosyn, Naproxen, Celebrex)    Do not stop taking your medications or begin taking over-the-counter or herbal medications without first talking to your doctor or nurse practitioner    Diet and Lifestyle       Limit sodium/salt to 2000 mg daily   o Read food labels for sodium content  o Do not add salt when cooking or add salt at the table    Weigh yourself every day and record in your daily weight log   o Call if you gain 2 pounds a day for 2 days in a row OR 5 pounds in 1  week  o Bring daily weight log to every appointment    Stay active, pace yourself, listen to your body, and rest when tired    Elevate your legs if they are swollen. Ask about using compression/support stockings    Stop smoking    Lose weight if you are overweight    Avoid drinking alcohol or limit amount    Stay updated on your immunizations including flu and pneumonia vaccines

## 2021-10-08 NOTE — TELEPHONE ENCOUNTER
----- Message from DES Anderson CNP sent at 10/8/2021  2:19 PM CDT -----  Labs are stable, proBNP pending. Please wait to call patient/son until proBNP results back. Thank you

## 2021-10-08 NOTE — PROGRESS NOTES
Patient and her son were seen in clinic for HF education with the use of an .  Reviewed HF Binder that includes the  HF Sx Awareness & and Weight log booklet highlighting :  __X_patient s type of heart failure _X__Na management in diet  __X_importance of daily wts  _X__Fluid Guidelines, if applicable  __X_medication review and importance of compliance   The son speaks English and is very attentive to the needs of his mother. He was offered education on the low sodium diet and fluid daily expectations. He does not feel that they will have too much difficulty in managing this dietary and fluid restriction.   Instructed patient in signs and sx of heart failure, reiterated when to call clinic - reviewed HF hotline # [phone] and after hours call # [phone].  Majority of time was spent reviewing: Low sodium management at home.  Patient verbalized understanding of HF discussion.  Plan for f/u with continued HF education reviewed.  No formal f/u scheduled with nurse clinician for continued education - will continue to reinforce HF management education.    Ivelisse Wong RN BSN, CHFN

## 2021-10-11 NOTE — TELEPHONE ENCOUNTER
Called pt patricia Paez and left message to call back clinic to review lab results and see how patient is doing.     Asha Viveros RN, NP Pearman, Anna M RN  Caller: Unspecified (3 days ago,  3:17 PM)  Lesvia is out of the office today.  BNP still elevated but has improved since 2 weeks ago.  According to Lesvia's note, symptoms have improved and exam showed no fluid retention.  No changes to bumex today.  Lesvia will review tomorrow when she returns

## 2021-10-12 NOTE — TELEPHONE ENCOUNTER
2nd attempt:   Called and left message with patient son Philippe to call back heart clinic to review recent lab results.     Britney Brothers, Lesvia Robertson, APRN Britney Braga RN  ProBNP has improved along with her CHRISTENSEN. No new orders

## 2021-10-13 NOTE — TELEPHONE ENCOUNTER
3rd attempt:     Called pt granddaughter, Dary-[facility] on file. Left VM to call back clinic regarding recent lab results.     Britney Loya RN

## 2021-10-14 NOTE — TELEPHONE ENCOUNTER
Pt granddaughter, Dary, called back; relayed lab results and no medication changes at this time.     Dary states that pt is doing well, no reports of CHRISTENSEN. Dary states that patient is not weighing herself daily. Reiterated importance of daily weights to monitor fluid retention and possible med changes. Dary will relay message to patient.     Britney Loya RN

## 2021-10-14 NOTE — TELEPHONE ENCOUNTER
3 attempts made to contact pt for stable labs with no changes.     Letter mailed to pt home today with lab results.     Britney Loya RN

## 2021-11-12 ENCOUNTER — OFFICE VISIT (OUTPATIENT)
Dept: CARDIOLOGY | Facility: CLINIC | Age: 76
End: 2021-11-12
Attending: NURSE PRACTITIONER
Payer: COMMERCIAL

## 2021-11-12 VITALS
SYSTOLIC BLOOD PRESSURE: 94 MMHG | RESPIRATION RATE: 16 BRPM | WEIGHT: 115.2 LBS | HEART RATE: 64 BPM | DIASTOLIC BLOOD PRESSURE: 64 MMHG

## 2021-11-12 DIAGNOSIS — I48.11 LONGSTANDING PERSISTENT ATRIAL FIBRILLATION (H): Primary | ICD-10-CM

## 2021-11-12 DIAGNOSIS — I50.20 HEART FAILURE WITH REDUCED EJECTION FRACTION (H): ICD-10-CM

## 2021-11-12 DIAGNOSIS — I25.10 CORONARY ARTERY DISEASE INVOLVING NATIVE CORONARY ARTERY OF NATIVE HEART WITHOUT ANGINA PECTORIS: ICD-10-CM

## 2021-11-12 PROCEDURE — 99214 OFFICE O/P EST MOD 30 MIN: CPT | Performed by: NURSE PRACTITIONER

## 2021-11-12 NOTE — PROGRESS NOTES
Assessment/Recommendations   Assessment:    1. Heart failure with reduced ejection fraction, NYHA class II: Compensated. She has fatigue and mild dyspnea on exertion. Her weights have been stable and she follows a low-salt diet.  2. Hypotension: Blood pressure 94/64. She denies any dizziness or lightheadedness.  3. Persistent atrial fibrillation: Rate controlled. She continues warfarin for anticoagulation and Lopressor for rate control    Plan:  1. Continue current medications. Unable to titrate due to hypotension     2. Continue daily weights and low-salt diet  3. Encourage regular activity    Jordan Rosario will follow up with Dr. Bobo in March and in the heart failure clinic in January.     History of Present Illness/Subjective    Ms. Jordan Rosario is a 75 year old female seen at [facility] heart failure clinic today for continued follow-up. Her son accompanies her today.  She follows up for heart failure with reduced ejection fraction.  She had an echocardiogram June 2021 which showed ejection fraction 30 to 35%.  She has a past medical history significant for diabetes, CAD, persistent atrial fibrillation, and pacemaker in 2015.     Today, she states she is doing well. She complains of mild dyspnea on exertion and fatigue.  She denies lightheadedness, shortness of breath, orthopnea, PND, palpitations, chest pain, abdominal fullness/bloating and lower extremity edema.      She is monitoring home weights which are stable.  She is following a low sodium diet.       Physical Examination Review of Systems   BP 94/64 (BP Location: Left arm, Patient Position: Sitting, Cuff Size: Adult Regular)   Pulse 64   Resp 16   Wt 52.3 kg (115 lb 3.2 oz)   There is no height or weight on file to calculate BMI.  Wt Readings from Last 3 Encounters:   11/12/21 52.3 kg (115 lb 3.2 oz)   10/08/21 54.3 kg (119 lb 9.6 oz)   10/01/21 54 kg (119 lb)       General Appearance:   no acute distress   ENT/Mouth: membranes moist, no oral  lesions or bleeding gums.      EYES:  no scleral icterus, normal conjunctivae   Neck: no carotid bruits or thyromegaly   Chest/Lungs:   lungs are clear to auscultation, no rales or wheezing, equal chest wall expansion    Cardiovascular:    Irregularly irregular. Normal first and second heart sounds with no murmurs, rubs, or gallops; Jugular venous pressure normal, no edema bilaterally    Abdomen:  no organomegaly, masses, bruits, or tenderness; bowel sounds are present   Extremities: no cyanosis or clubbing   Skin: no xanthelasma, warm.    Neurologic: normal  bilateral, no tremors     Psychiatric: alert and oriented x3    Enc Vitals  BP: 94/64  Pulse: 64  Resp: 16  Weight: 52.3 kg (115 lb 3.2 oz)                                         Medical History  Surgical History Family History Social History   Past Medical History:   Diagnosis Date     Atrial fibrillation (H)      Congestive heart failure (H)      Coronary artery disease      Hyperlipidemia     Past Surgical History:   Procedure Laterality Date     IMPLANT PACEMAKER      No family history on file. Social History     Socioeconomic History     Marital status:      Spouse name: Not on file     Number of children: Not on file     Years of education: Not on file     Highest education level: Not on file   Occupational History     Not on file   Tobacco Use     Smoking status: Never Smoker     Smokeless tobacco: Never Used   Substance and Sexual Activity     Alcohol use: Not on file     Drug use: Not on file     Sexual activity: Not on file   Other Topics Concern     Parent/sibling w/ CABG, MI or angioplasty before 65F 55M? Not Asked   Social History Narrative     Not on file     Social Determinants of Health     Financial Resource Strain: Not on file   Food Insecurity: Not on file   Transportation Needs: Not on file   Physical Activity: Not on file   Stress: Not on file   Social Connections: Not on file   Intimate Partner Violence: Not on file   Housing  Stability: Not on file          Medications  Allergies   Current Outpatient Medications   Medication Sig Dispense Refill     acetaminophen (TYLENOL) 500 MG tablet daily as needed       bumetanide (BUMEX) 1 MG tablet Take 1 mg by mouth daily       diclofenac (VOLTAREN) 1 % topical gel daily as needed       glipiZIDE (GLUCOTROL) 5 MG tablet Take 5 mg by mouth daily        insulin glargine (BASAGLAR KWIKPEN) 100 UNIT/ML pen Inject 35 Units Subcutaneous daily 10.5 mL 0     metFORMIN (GLUCOPHAGE) 1000 MG tablet Take 1,000 mg by mouth daily (with breakfast)        metoprolol tartrate (LOPRESSOR) 50 MG tablet Take 75 mg by mouth 2 times daily Take 1.5 tablets twice daily       warfarin ANTICOAGULANT (COUMADIN) 2.5 MG tablet daily       NOVOLOG FLEXPEN 100 UNIT/ML soln daily (Patient not taking: Reported on 11/12/2021)       predniSONE (DELTASONE) 10 MG tablet daily (Patient not taking: Reported on 11/12/2021)      No Known Allergies      Lab Results    Chemistry/lipid CBC Cardiac Enzymes/BNP/TSH/INR   Lab Results   Component Value Date    BUN 47 (H) 10/08/2021     (L) 10/08/2021    CO2 27 10/08/2021    No results found for: WBC, HGB, HCT, MCV, PLT No results found for: CKTOTAL, CKMB, TROPONINI, BNP, TSH, INR          This note has been dictated using voice recognition software. Any grammatical, typographical, or context distortions are unintentional and inherent to the software

## 2021-11-12 NOTE — LETTER
11/12/2021    DIANE GUAMAN MD  [facility] [address]  [facility] [address]    RE: Jordan Rosario       Dear Colleague,    I had the pleasure of seeing Jordan Rosario in the [facility].        Assessment/Recommendations   Assessment:    1. Heart failure with reduced ejection fraction, NYHA class II: Compensated. She has fatigue and mild dyspnea on exertion. Her weights have been stable and she follows a low-salt diet.  2. Hypotension: Blood pressure 94/64. She denies any dizziness or lightheadedness.  3. Persistent atrial fibrillation: Rate controlled. She continues warfarin for anticoagulation and Lopressor for rate control    Plan:  1. Continue current medications. Unable to titrate due to hypotension     2. Continue daily weights and low-salt diet  3. Encourage regular activity    Jordan Rosario will follow up with Dr. Bobo in March and in the heart failure clinic in January.     History of Present Illness/Subjective    Ms. Jordan Rosario is a 75 year old female seen at [facility] heart failure clinic today for continued follow-up. Her son accompanies her today.  She follows up for heart failure with reduced ejection fraction.  She had an echocardiogram June 2021 which showed ejection fraction 30 to 35%.  She has a past medical history significant for diabetes, CAD, persistent atrial fibrillation, and pacemaker in 2015.     Today, she states she is doing well. She complains of mild dyspnea on exertion and fatigue.  She denies lightheadedness, shortness of breath, orthopnea, PND, palpitations, chest pain, abdominal fullness/bloating and lower extremity edema.      She is monitoring home weights which are stable.  She is following a low sodium diet.       Physical Examination Review of Systems   BP 94/64 (BP Location: Left arm, Patient Position: Sitting, Cuff Size: Adult Regular)   Pulse 64   Resp 16   Wt 52.3 kg (115 lb 3.2 oz)   There is no height or  weight on file to calculate BMI.  Wt Readings from Last 3 Encounters:   11/12/21 52.3 kg (115 lb 3.2 oz)   10/08/21 54.3 kg (119 lb 9.6 oz)   10/01/21 54 kg (119 lb)       General Appearance:   no acute distress   ENT/Mouth: membranes moist, no oral lesions or bleeding gums.      EYES:  no scleral icterus, normal conjunctivae   Neck: no carotid bruits or thyromegaly   Chest/Lungs:   lungs are clear to auscultation, no rales or wheezing, equal chest wall expansion    Cardiovascular:    Irregularly irregular. Normal first and second heart sounds with no murmurs, rubs, or gallops; Jugular venous pressure normal, no edema bilaterally    Abdomen:  no organomegaly, masses, bruits, or tenderness; bowel sounds are present   Extremities: no cyanosis or clubbing   Skin: no xanthelasma, warm.    Neurologic: normal  bilateral, no tremors     Psychiatric: alert and oriented x3    Enc Vitals  BP: 94/64  Pulse: 64  Resp: 16  Weight: 52.3 kg (115 lb 3.2 oz)                                         Medical History  Surgical History Family History Social History   Past Medical History:   Diagnosis Date     Atrial fibrillation (H)      Congestive heart failure (H)      Coronary artery disease      Hyperlipidemia     Past Surgical History:   Procedure Laterality Date     IMPLANT PACEMAKER      No family history on file. Social History     Socioeconomic History     Marital status:      Spouse name: Not on file     Number of children: Not on file     Years of education: Not on file     Highest education level: Not on file   Occupational History     Not on file   Tobacco Use     Smoking status: Never Smoker     Smokeless tobacco: Never Used   Substance and Sexual Activity     Alcohol use: Not on file     Drug use: Not on file     Sexual activity: Not on file   Other Topics Concern     Parent/sibling w/ CABG, MI or angioplasty before 65F 55M? Not Asked   Social History Narrative     Not on file     Social Determinants of Health      Financial Resource Strain: Not on file   Food Insecurity: Not on file   Transportation Needs: Not on file   Physical Activity: Not on file   Stress: Not on file   Social Connections: Not on file   Intimate Partner Violence: Not on file   Housing Stability: Not on file          Medications  Allergies   Current Outpatient Medications   Medication Sig Dispense Refill     acetaminophen (TYLENOL) 500 MG tablet daily as needed       bumetanide (BUMEX) 1 MG tablet Take 1 mg by mouth daily       diclofenac (VOLTAREN) 1 % topical gel daily as needed       glipiZIDE (GLUCOTROL) 5 MG tablet Take 5 mg by mouth daily        insulin glargine (BASAGLAR KWIKPEN) 100 UNIT/ML pen Inject 35 Units Subcutaneous daily 10.5 mL 0     metFORMIN (GLUCOPHAGE) 1000 MG tablet Take 1,000 mg by mouth daily (with breakfast)        metoprolol tartrate (LOPRESSOR) 50 MG tablet Take 75 mg by mouth 2 times daily Take 1.5 tablets twice daily       warfarin ANTICOAGULANT (COUMADIN) 2.5 MG tablet daily       NOVOLOG FLEXPEN 100 UNIT/ML soln daily (Patient not taking: Reported on 11/12/2021)       predniSONE (DELTASONE) 10 MG tablet daily (Patient not taking: Reported on 11/12/2021)      No Known Allergies      Lab Results    Chemistry/lipid CBC Cardiac Enzymes/BNP/TSH/INR   Lab Results   Component Value Date    BUN 47 (H) 10/08/2021     (L) 10/08/2021    CO2 27 10/08/2021    No results found for: WBC, HGB, HCT, MCV, PLT No results found for: CKTOTAL, CKMB, TROPONINI, BNP, TSH, INR          This note has been dictated using voice recognition software. Any grammatical, typographical, or context distortions are unintentional and inherent to the software

## 2021-11-12 NOTE — PATIENT INSTRUCTIONS
Jordan Rosario,    It was a pleasure to see you today at [facility].     My recommendations after this visit include:  - Please follow up with Dr Bobo in March   - Please follow up with Lesvia Brothers in January  - Continue current medications    Lesvia Brothers, CNP

## 2022-02-24 ENCOUNTER — ANCILLARY PROCEDURE (OUTPATIENT)
Dept: CARDIOLOGY | Facility: CLINIC | Age: 77
End: 2022-02-24
Attending: INTERNAL MEDICINE
Payer: COMMERCIAL

## 2022-02-24 DIAGNOSIS — Z95.0 PACEMAKER: ICD-10-CM

## 2022-02-24 DIAGNOSIS — I49.5 SICK SINUS SYNDROME (H): ICD-10-CM

## 2022-02-24 PROCEDURE — 93294 REM INTERROG EVL PM/LDLS PM: CPT | Performed by: INTERNAL MEDICINE

## 2022-02-24 PROCEDURE — 93296 REM INTERROG EVL PM/IDS: CPT | Performed by: INTERNAL MEDICINE

## 2022-02-25 ENCOUNTER — OFFICE VISIT (OUTPATIENT)
Dept: CARDIOLOGY | Facility: CLINIC | Age: 77
End: 2022-02-25
Attending: NURSE PRACTITIONER
Payer: COMMERCIAL

## 2022-02-25 VITALS
HEART RATE: 82 BPM | RESPIRATION RATE: 14 BRPM | SYSTOLIC BLOOD PRESSURE: 136 MMHG | DIASTOLIC BLOOD PRESSURE: 84 MMHG | WEIGHT: 119 LBS

## 2022-02-25 DIAGNOSIS — I50.20 HEART FAILURE WITH REDUCED EJECTION FRACTION (H): ICD-10-CM

## 2022-02-25 DIAGNOSIS — N18.31 STAGE 3A CHRONIC KIDNEY DISEASE (H): ICD-10-CM

## 2022-02-25 DIAGNOSIS — I25.10 CORONARY ARTERY DISEASE INVOLVING NATIVE CORONARY ARTERY OF NATIVE HEART WITHOUT ANGINA PECTORIS: Primary | ICD-10-CM

## 2022-02-25 DIAGNOSIS — I48.11 LONGSTANDING PERSISTENT ATRIAL FIBRILLATION (H): ICD-10-CM

## 2022-02-25 LAB
ANION GAP SERPL CALCULATED.3IONS-SCNC: 9 MMOL/L (ref 5–18)
BUN SERPL-MCNC: 42 MG/DL (ref 8–28)
CALCIUM SERPL-MCNC: 8.9 MG/DL (ref 8.5–10.5)
CHLORIDE BLD-SCNC: 104 MMOL/L (ref 98–107)
CO2 SERPL-SCNC: 25 MMOL/L (ref 22–31)
CREAT SERPL-MCNC: 1.37 MG/DL (ref 0.6–1.1)
GFR SERPL CREATININE-BSD FRML MDRD: 40 ML/MIN/1.73M2
GLUCOSE BLD-MCNC: 92 MG/DL (ref 70–125)
MDC_IDC_LEAD_IMPLANT_DT: NORMAL
MDC_IDC_LEAD_IMPLANT_DT: NORMAL
MDC_IDC_LEAD_LOCATION: NORMAL
MDC_IDC_LEAD_LOCATION: NORMAL
MDC_IDC_LEAD_LOCATION_DETAIL_1: NORMAL
MDC_IDC_LEAD_LOCATION_DETAIL_1: NORMAL
MDC_IDC_LEAD_MFG: NORMAL
MDC_IDC_LEAD_MFG: NORMAL
MDC_IDC_LEAD_MODEL: NORMAL
MDC_IDC_LEAD_MODEL: NORMAL
MDC_IDC_LEAD_POLARITY_TYPE: NORMAL
MDC_IDC_LEAD_POLARITY_TYPE: NORMAL
MDC_IDC_LEAD_SERIAL: NORMAL
MDC_IDC_LEAD_SERIAL: NORMAL
MDC_IDC_MSMT_BATTERY_DTM: NORMAL
MDC_IDC_MSMT_BATTERY_IMPEDANCE: 405 OHM
MDC_IDC_MSMT_BATTERY_REMAINING_LONGEVITY: 124 MO
MDC_IDC_MSMT_BATTERY_STATUS: NORMAL
MDC_IDC_MSMT_BATTERY_VOLTAGE: 2.77 V
MDC_IDC_MSMT_LEADCHNL_RA_IMPEDANCE_VALUE: 67 OHM
MDC_IDC_MSMT_LEADCHNL_RV_IMPEDANCE_VALUE: 444 OHM
MDC_IDC_MSMT_LEADCHNL_RV_PACING_THRESHOLD_AMPLITUDE: 0.62 V
MDC_IDC_MSMT_LEADCHNL_RV_PACING_THRESHOLD_PULSEWIDTH: 0.4 MS
MDC_IDC_PG_IMPLANT_DTM: NORMAL
MDC_IDC_PG_MFG: NORMAL
MDC_IDC_PG_MODEL: NORMAL
MDC_IDC_PG_SERIAL: NORMAL
MDC_IDC_PG_TYPE: NORMAL
MDC_IDC_SESS_CLINIC_NAME: NORMAL
MDC_IDC_SESS_DTM: NORMAL
MDC_IDC_SESS_TYPE: NORMAL
MDC_IDC_SET_BRADY_LOWRATE: 60 {BEATS}/MIN
MDC_IDC_SET_BRADY_MAX_SENSOR_RATE: 130 {BEATS}/MIN
MDC_IDC_SET_BRADY_MAX_TRACKING_RATE: 110 {BEATS}/MIN
MDC_IDC_SET_BRADY_MODE: NORMAL
MDC_IDC_SET_LEADCHNL_RV_PACING_AMPLITUDE: 1.5 V
MDC_IDC_SET_LEADCHNL_RV_PACING_CAPTURE_MODE: NORMAL
MDC_IDC_SET_LEADCHNL_RV_PACING_POLARITY: NORMAL
MDC_IDC_SET_LEADCHNL_RV_PACING_PULSEWIDTH: 0.4 MS
MDC_IDC_SET_LEADCHNL_RV_SENSING_POLARITY: NORMAL
MDC_IDC_SET_LEADCHNL_RV_SENSING_SENSITIVITY: 1.4 MV
MDC_IDC_SET_ZONE_DETECTION_INTERVAL: 375 MS
MDC_IDC_SET_ZONE_TYPE: NORMAL
MDC_IDC_SET_ZONE_TYPE: NORMAL
MDC_IDC_STAT_AT_BURDEN_PERCENT: 0 %
MDC_IDC_STAT_AT_DTM_END: NORMAL
MDC_IDC_STAT_AT_DTM_START: NORMAL
MDC_IDC_STAT_BRADY_DTM_END: NORMAL
MDC_IDC_STAT_BRADY_DTM_START: NORMAL
MDC_IDC_STAT_BRADY_RV_PERCENT_PACED: 2 %
MDC_IDC_STAT_EPISODE_RECENT_COUNT: 0
MDC_IDC_STAT_EPISODE_RECENT_COUNT: 3610
MDC_IDC_STAT_EPISODE_RECENT_COUNT_DTM_END: NORMAL
MDC_IDC_STAT_EPISODE_RECENT_COUNT_DTM_END: NORMAL
MDC_IDC_STAT_EPISODE_RECENT_COUNT_DTM_START: NORMAL
MDC_IDC_STAT_EPISODE_RECENT_COUNT_DTM_START: NORMAL
MDC_IDC_STAT_EPISODE_TYPE: NORMAL
MDC_IDC_STAT_EPISODE_TYPE: NORMAL
POTASSIUM BLD-SCNC: 3.6 MMOL/L (ref 3.5–5)
SODIUM SERPL-SCNC: 138 MMOL/L (ref 136–145)

## 2022-02-25 PROCEDURE — 80048 BASIC METABOLIC PNL TOTAL CA: CPT | Performed by: NURSE PRACTITIONER

## 2022-02-25 PROCEDURE — 99214 OFFICE O/P EST MOD 30 MIN: CPT | Performed by: NURSE PRACTITIONER

## 2022-02-25 PROCEDURE — 36415 COLL VENOUS BLD VENIPUNCTURE: CPT | Performed by: NURSE PRACTITIONER

## 2022-02-25 NOTE — PROGRESS NOTES
Assessment/Recommendations   Assessment:    1.  Heart failure with reduced ejection fraction, NYHA class II: Compensated.  She has fatigue and dyspnea on exertion per her son.  Her weight has been stable.  She has been sleeping and eating well.  He cooks for her and follows a low-sodium diet.  2.  Persistent atrial fibrillation: Rate controlled.  She continues warfarin for anticoagulation and Lopressor for rate control  3.  Chronic kidney disease stage III: BMP pending.  She had labs in October with a sodium 132, BUN 47, potassium 4.2 and creatinine 1.52    Plan:  1.  BMP pending  2.  Continue current medications  3.  Continue monitoring weights and low-salt diet  4.  Encourage regular activity    Jordan Rosario will follow up Dr. Bobo on March 25 and in the heart failure clinic in 4 months.     History of Present Illness/Subjective    Ms. Jordan Rosario is a 76 year old female seen at [facility] heart failure clinic today for continued follow-up.  Her son accompanies her today.  He interprets for her today.  She follows up for heart failure with reduced ejection fraction.  She had an echocardiogram June 2021 which showed ejection fraction 30 to 35%.  She has a past medical history significant for diabetes, CAD, chronic kidney disease stage III, persistent atrial fibrillation, and pacemaker in 2015.      Today, he states she has been doing well.  She does have mild dyspnea on exertion and fatigue.  He states she has been sleeping and eating well.  She denies lightheadedness, shortness of breath, orthopnea, PND, palpitations, chest pain, abdominal fullness/bloating and lower extremity edema.      She is monitoring home weights which are stable.  She is following a low sodium diet.       Physical Examination Review of Systems   /84 (BP Location: Left arm, Patient Position: Sitting, Cuff Size: Adult Regular)   Pulse 82   Resp 14   Wt 54 kg (119 lb)   There is no height or weight on file to calculate  BMI.  Wt Readings from Last 3 Encounters:   02/25/22 54 kg (119 lb)   11/12/21 52.3 kg (115 lb 3.2 oz)   10/08/21 54.3 kg (119 lb 9.6 oz)       General Appearance:   no acute distress   ENT/Mouth: membranes moist, no oral lesions or bleeding gums.      EYES:  no scleral icterus, normal conjunctivae   Neck: no carotid bruits or thyromegaly   Chest/Lungs:   lungs are clear to auscultation, no rales or wheezing, equal chest wall expansion    Cardiovascular:    Irregularly irregular. Normal first and second heart sounds with no murmurs, rubs, or gallops; Jugular venous pressure normal, no edema bilaterally    Abdomen:  no organomegaly, masses, bruits, or tenderness; bowel sounds are present   Extremities: no cyanosis or clubbing   Skin: no xanthelasma, warm.    Neurologic: normal  bilateral, no tremors     Psychiatric: alert and oriented x3    Enc Vitals  BP: 136/84  Pulse: 82  Resp: 14  Weight: 54 kg (119 lb) (With shoes.)                                         Medical History  Surgical History Family History Social History   Past Medical History:   Diagnosis Date     Atrial fibrillation (H)      Congestive heart failure (H)      Coronary artery disease      Hyperlipidemia     Past Surgical History:   Procedure Laterality Date     IMPLANT PACEMAKER      No family history on file. Social History     Socioeconomic History     Marital status:      Spouse name: Not on file     Number of children: Not on file     Years of education: Not on file     Highest education level: Not on file   Occupational History     Not on file   Tobacco Use     Smoking status: Never Smoker     Smokeless tobacco: Never Used   Substance and Sexual Activity     Alcohol use: Not on file     Drug use: Not on file     Sexual activity: Not on file   Other Topics Concern     Parent/sibling w/ CABG, MI or angioplasty before 65F 55M? Not Asked   Social History Narrative     Not on file     Social Determinants of Health     Financial Resource  Strain: Not on file   Food Insecurity: Not on file   Transportation Needs: Not on file   Physical Activity: Not on file   Stress: Not on file   Social Connections: Not on file   Intimate Partner Violence: Not on file   Housing Stability: Not on file          Medications  Allergies   Current Outpatient Medications   Medication Sig Dispense Refill     acetaminophen (TYLENOL) 500 MG tablet daily as needed       bumetanide (BUMEX) 1 MG tablet Take 1 mg by mouth daily       diclofenac (VOLTAREN) 1 % topical gel daily as needed       glipiZIDE (GLUCOTROL) 5 MG tablet Take 5 mg by mouth daily        metFORMIN (GLUCOPHAGE) 1000 MG tablet Take 1,000 mg by mouth daily (with breakfast)        metoprolol tartrate (LOPRESSOR) 50 MG tablet Take 75 mg by mouth 2 times daily Take 1.5 tablets twice daily       NOVOLOG FLEXPEN 100 UNIT/ML soln daily        predniSONE (DELTASONE) 10 MG tablet daily        warfarin ANTICOAGULANT (COUMADIN) 2.5 MG tablet daily       insulin glargine (BASAGLAR KWIKPEN) 100 UNIT/ML pen Inject 35 Units Subcutaneous daily 10.5 mL 0    No Known Allergies      Lab Results    Chemistry/lipid CBC Cardiac Enzymes/BNP/TSH/INR   Lab Results   Component Value Date    BUN 47 (H) 10/08/2021     (L) 10/08/2021    CO2 27 10/08/2021    No results found for: WBC, HGB, HCT, MCV, PLT No results found for: CKTOTAL, CKMB, TROPONINI, BNP, TSH, INR          This note has been dictated using voice recognition software. Any grammatical, typographical, or context distortions are unintentional and inherent to the software    30 minutes spent on the date of encounter doing chart review, review of outside records, review of test results, patient visit, documentation and discussion with family.

## 2022-02-25 NOTE — LETTER
2/25/2022    DIANE GUAMAN MD  [facility] [address]  [facility] [place] [address]    RE: Jordan Rosario       Dear Colleague,     I had the pleasure of seeing Jordan Rosario in the [facility].        Assessment/Recommendations   Assessment:    1.  Heart failure with reduced ejection fraction, NYHA class II: Compensated.  She has fatigue and dyspnea on exertion per her son.  Her weight has been stable.  She has been sleeping and eating well.  He cooks for her and follows a low-sodium diet.  2.  Persistent atrial fibrillation: Rate controlled.  She continues warfarin for anticoagulation and Lopressor for rate control  3.  Chronic kidney disease stage III: BMP pending.  She had labs in October with a sodium 132, BUN 47, potassium 4.2 and creatinine 1.52    Plan:  1.  BMP pending  2.  Continue current medications  3.  Continue monitoring weights and low-salt diet  4.  Encourage regular activity    Jordan Rosario will follow up Dr. Bobo on March 25 and in the heart failure clinic in 4 months.     History of Present Illness/Subjective    Ms. Jordan Rosario is a 76 year old female seen at [facility] heart failure clinic today for continued follow-up.  Her son accompanies her today.  He interprets for her today.  She follows up for heart failure with reduced ejection fraction.  She had an echocardiogram June 2021 which showed ejection fraction 30 to 35%.  She has a past medical history significant for diabetes, CAD, chronic kidney disease stage III, persistent atrial fibrillation, and pacemaker in 2015.      Today, he states she has been doing well.  She does have mild dyspnea on exertion and fatigue.  He states she has been sleeping and eating well.  She denies lightheadedness, shortness of breath, orthopnea, PND, palpitations, chest pain, abdominal fullness/bloating and lower extremity edema.      She is monitoring home weights which are stable.  She is following a low sodium diet.       Physical  Examination Review of Systems   /84 (BP Location: Left arm, Patient Position: Sitting, Cuff Size: Adult Regular)   Pulse 82   Resp 14   Wt 54 kg (119 lb)   There is no height or weight on file to calculate BMI.  Wt Readings from Last 3 Encounters:   02/25/22 54 kg (119 lb)   11/12/21 52.3 kg (115 lb 3.2 oz)   10/08/21 54.3 kg (119 lb 9.6 oz)       General Appearance:   no acute distress   ENT/Mouth: membranes moist, no oral lesions or bleeding gums.      EYES:  no scleral icterus, normal conjunctivae   Neck: no carotid bruits or thyromegaly   Chest/Lungs:   lungs are clear to auscultation, no rales or wheezing, equal chest wall expansion    Cardiovascular:    Irregularly irregular. Normal first and second heart sounds with no murmurs, rubs, or gallops; Jugular venous pressure normal, no edema bilaterally    Abdomen:  no organomegaly, masses, bruits, or tenderness; bowel sounds are present   Extremities: no cyanosis or clubbing   Skin: no xanthelasma, warm.    Neurologic: normal  bilateral, no tremors     Psychiatric: alert and oriented x3    Enc Vitals  BP: 136/84  Pulse: 82  Resp: 14  Weight: 54 kg (119 lb) (With shoes.)                                         Medical History  Surgical History Family History Social History   Past Medical History:   Diagnosis Date     Atrial fibrillation (H)      Congestive heart failure (H)      Coronary artery disease      Hyperlipidemia     Past Surgical History:   Procedure Laterality Date     IMPLANT PACEMAKER      No family history on file. Social History     Socioeconomic History     Marital status:      Spouse name: Not on file     Number of children: Not on file     Years of education: Not on file     Highest education level: Not on file   Occupational History     Not on file   Tobacco Use     Smoking status: Never Smoker     Smokeless tobacco: Never Used   Substance and Sexual Activity     Alcohol use: Not on file     Drug use: Not on file     Sexual  activity: Not on file   Other Topics Concern     Parent/sibling w/ CABG, MI or angioplasty before 65F 55M? Not Asked   Social History Narrative     Not on file     Social Determinants of Health     Financial Resource Strain: Not on file   Food Insecurity: Not on file   Transportation Needs: Not on file   Physical Activity: Not on file   Stress: Not on file   Social Connections: Not on file   Intimate Partner Violence: Not on file   Housing Stability: Not on file          Medications  Allergies   Current Outpatient Medications   Medication Sig Dispense Refill     acetaminophen (TYLENOL) 500 MG tablet daily as needed       bumetanide (BUMEX) 1 MG tablet Take 1 mg by mouth daily       diclofenac (VOLTAREN) 1 % topical gel daily as needed       glipiZIDE (GLUCOTROL) 5 MG tablet Take 5 mg by mouth daily        metFORMIN (GLUCOPHAGE) 1000 MG tablet Take 1,000 mg by mouth daily (with breakfast)        metoprolol tartrate (LOPRESSOR) 50 MG tablet Take 75 mg by mouth 2 times daily Take 1.5 tablets twice daily       NOVOLOG FLEXPEN 100 UNIT/ML soln daily        predniSONE (DELTASONE) 10 MG tablet daily        warfarin ANTICOAGULANT (COUMADIN) 2.5 MG tablet daily       insulin glargine (BASAGLAR KWIKPEN) 100 UNIT/ML pen Inject 35 Units Subcutaneous daily 10.5 mL 0    No Known Allergies      Lab Results    Chemistry/lipid CBC Cardiac Enzymes/BNP/TSH/INR   Lab Results   Component Value Date    BUN 47 (H) 10/08/2021     (L) 10/08/2021    CO2 27 10/08/2021    No results found for: WBC, HGB, HCT, MCV, PLT No results found for: CKTOTAL, CKMB, TROPONINI, BNP, TSH, INR          This note has been dictated using voice recognition software. Any grammatical, typographical, or context distortions are unintentional and inherent to the software    30 minutes spent on the date of encounter doing chart review, review of outside records, review of test results, patient visit, documentation and discussion with familyMike ELENA  [facility]  cc:   Lesvia Brothers, APRN CNP  [address]  [address] [place] [address]

## 2022-02-25 NOTE — PATIENT INSTRUCTIONS
Jordan Rosario,    It was a pleasure to see you today at [facility].     My recommendations after this visit include:  - Please follow up with Dr Bobo March 25   - Please follow up with Lesvia Brothers in June  - Continue current medications  - I have checked labs and will contact you with results    Lesvia Brothers, CNP

## 2022-04-13 ENCOUNTER — TELEPHONE (OUTPATIENT)
Dept: CARDIOLOGY | Facility: CLINIC | Age: 77
End: 2022-04-13
Payer: COMMERCIAL

## 2022-04-13 NOTE — TELEPHONE ENCOUNTER
[facility] Call Center    Phone Message    May a detailed message be left on voicemail: yes     Reason for Call: Other: Mildred with [facility] called and spoke with writer, she states she will need to speak with care team regarding mutual patient, as she has not been taking her Warfrin since Feb 2022 and have not been having her INR checked.Please call Mildred      Action Taken: Message routed to:  [facility] ([facility] Cardio     Travel Screening: Not Applicable

## 2022-04-15 NOTE — TELEPHONE ENCOUNTER
According to my note in September, she was started back on warfarin anticoagulation by her PCP for chronic atrial fibrillation and they were going to monitor her INRs.  This issue will need to go back to her primary care to decide whether to try her on Eliquis if her renal function will allow as she is at high risk for stroke given her age, female gender, congestive heart failure, hypertension.    Patricia

## 2022-04-15 NOTE — TELEPHONE ENCOUNTER
Return call to [facility] to speak with Mildred. Received FYI that patient has not been taking warfarin since Feb 2022. Son stopped this medication d/t perceived side effects. Per report from PCP clinic, patient was not having INRs checked while taking this medication.   Advised Mildred that cardiologist, Dr. Bobo has only seen patient one time in September and we were not prescribing warfarin and patient is not enrolled in our anti-coag clinic.   Per report from Mildred, patient's son declined to restart warfarin until she is seen in Heart Care in May.     FYI. Dr. Bobo. -[email or web address]

## 2022-04-20 NOTE — TELEPHONE ENCOUNTER
Reviewed response from Dr. Bobo with Mildred at [facility]. Understanding verbalized and will be addressed with PCP. -noemi

## 2022-11-25 ENCOUNTER — DOCUMENTATION ONLY (OUTPATIENT)
Dept: CARDIOLOGY | Facility: CLINIC | Age: 77
End: 2022-11-25

## 2022-11-25 DIAGNOSIS — Z95.0 CARDIAC PACEMAKER IN SITU: Primary | ICD-10-CM

## 2022-11-25 DIAGNOSIS — I49.5 SICK SINUS SYNDROME (H): ICD-10-CM

## 2022-11-25 NOTE — PROGRESS NOTES
Multiple attempts to reach the patient/family have been unsuccessful. Our standard practice is remotes Q3 months and follow up in the clinic once a year or can do Q6 checks to ensure proper device function/rhythm detection. Delinquent letter was sent per protocol asking the patient to update our clinic or will be made inactive 30 days after the letter has been sent.       Lizzeth Garg  Device Tech  [phone]

## 2022-12-30 ENCOUNTER — DOCUMENTATION ONLY (OUTPATIENT)
Dept: CARDIOLOGY | Facility: CLINIC | Age: 77
End: 2022-12-30

## 2022-12-30 NOTE — PROGRESS NOTES
Patient did not respond to the delinquent letter sent 11/25/2022. After 30 days of letter and no response, patient has been made inactive in [employer] database. This does not affect the function of their implanted device, however unable to monitor status. Patient is welcome to re-establish with [facility] anytime patient wishes.      Lizzeth Garg  Device Tech  [phone]

## 2024-04-01 ENCOUNTER — TRANSFERRED RECORDS (OUTPATIENT)
Dept: HEALTH INFORMATION MANAGEMENT | Facility: CLINIC | Age: 79
End: 2024-04-01

## 2024-04-12 ENCOUNTER — ANCILLARY PROCEDURE (OUTPATIENT)
Dept: CARDIOLOGY | Facility: CLINIC | Age: 79
End: 2024-04-12
Attending: INTERNAL MEDICINE
Payer: COMMERCIAL

## 2024-04-12 DIAGNOSIS — I49.5 SICK SINUS SYNDROME (H): ICD-10-CM

## 2024-04-12 DIAGNOSIS — Z95.0 CARDIAC PACEMAKER IN SITU: ICD-10-CM

## 2024-04-12 LAB
MDC_IDC_LEAD_CONNECTION_STATUS: NORMAL
MDC_IDC_LEAD_CONNECTION_STATUS: NORMAL
MDC_IDC_LEAD_IMPLANT_DT: NORMAL
MDC_IDC_LEAD_IMPLANT_DT: NORMAL
MDC_IDC_LEAD_LOCATION: NORMAL
MDC_IDC_LEAD_LOCATION: NORMAL
MDC_IDC_LEAD_LOCATION_DETAIL_1: NORMAL
MDC_IDC_LEAD_LOCATION_DETAIL_1: NORMAL
MDC_IDC_LEAD_MFG: NORMAL
MDC_IDC_LEAD_MFG: NORMAL
MDC_IDC_LEAD_MODEL: NORMAL
MDC_IDC_LEAD_MODEL: NORMAL
MDC_IDC_LEAD_POLARITY_TYPE: NORMAL
MDC_IDC_LEAD_POLARITY_TYPE: NORMAL
MDC_IDC_LEAD_SERIAL: NORMAL
MDC_IDC_LEAD_SERIAL: NORMAL
MDC_IDC_MSMT_BATTERY_DTM: NORMAL
MDC_IDC_MSMT_BATTERY_IMPEDANCE: 865 OHM
MDC_IDC_MSMT_BATTERY_REMAINING_LONGEVITY: 87 MO
MDC_IDC_MSMT_BATTERY_STATUS: NORMAL
MDC_IDC_MSMT_BATTERY_VOLTAGE: 2.77 V
MDC_IDC_MSMT_LEADCHNL_RV_IMPEDANCE_VALUE: 445 OHM
MDC_IDC_MSMT_LEADCHNL_RV_PACING_THRESHOLD_AMPLITUDE: 0.75 V
MDC_IDC_MSMT_LEADCHNL_RV_PACING_THRESHOLD_AMPLITUDE: 1 V
MDC_IDC_MSMT_LEADCHNL_RV_PACING_THRESHOLD_PULSEWIDTH: 0.4 MS
MDC_IDC_MSMT_LEADCHNL_RV_PACING_THRESHOLD_PULSEWIDTH: 0.4 MS
MDC_IDC_MSMT_LEADCHNL_RV_SENSING_INTR_AMPL: 4 MV
MDC_IDC_PG_IMPLANT_DTM: NORMAL
MDC_IDC_PG_MFG: NORMAL
MDC_IDC_PG_MODEL: NORMAL
MDC_IDC_PG_SERIAL: NORMAL
MDC_IDC_PG_TYPE: NORMAL
MDC_IDC_SESS_CLINIC_NAME: NORMAL
MDC_IDC_SESS_DTM: NORMAL
MDC_IDC_SESS_TYPE: NORMAL
MDC_IDC_SET_BRADY_LOWRATE: 60 {BEATS}/MIN
MDC_IDC_SET_BRADY_MAX_SENSOR_RATE: 130 {BEATS}/MIN
MDC_IDC_SET_BRADY_MAX_TRACKING_RATE: 110 {BEATS}/MIN
MDC_IDC_SET_BRADY_MODE: NORMAL
MDC_IDC_SET_LEADCHNL_RV_PACING_AMPLITUDE: 1.5 V
MDC_IDC_SET_LEADCHNL_RV_PACING_CAPTURE_MODE: NORMAL
MDC_IDC_SET_LEADCHNL_RV_PACING_POLARITY: NORMAL
MDC_IDC_SET_LEADCHNL_RV_PACING_PULSEWIDTH: 0.4 MS
MDC_IDC_SET_LEADCHNL_RV_SENSING_POLARITY: NORMAL
MDC_IDC_SET_LEADCHNL_RV_SENSING_SENSITIVITY: 1.4 MV
MDC_IDC_SET_ZONE_DETECTION_INTERVAL: 375 MS
MDC_IDC_SET_ZONE_STATUS: NORMAL
MDC_IDC_SET_ZONE_STATUS: NORMAL
MDC_IDC_SET_ZONE_TYPE: NORMAL
MDC_IDC_SET_ZONE_TYPE: NORMAL
MDC_IDC_SET_ZONE_VENDOR_TYPE: NORMAL
MDC_IDC_SET_ZONE_VENDOR_TYPE: NORMAL
MDC_IDC_STAT_AT_BURDEN_PERCENT: 0 %
MDC_IDC_STAT_AT_DTM_END: NORMAL
MDC_IDC_STAT_AT_DTM_START: NORMAL
MDC_IDC_STAT_AT_MODE_SW_COUNT: 0
MDC_IDC_STAT_BRADY_DTM_END: NORMAL
MDC_IDC_STAT_BRADY_DTM_START: NORMAL
MDC_IDC_STAT_BRADY_RV_PERCENT_PACED: 7 %
MDC_IDC_STAT_EPISODE_RECENT_COUNT: 0
MDC_IDC_STAT_EPISODE_RECENT_COUNT: 8806
MDC_IDC_STAT_EPISODE_RECENT_COUNT: 8806
MDC_IDC_STAT_EPISODE_RECENT_COUNT_DTM_END: NORMAL
MDC_IDC_STAT_EPISODE_RECENT_COUNT_DTM_START: NORMAL
MDC_IDC_STAT_EPISODE_TYPE: NORMAL

## 2024-04-12 PROCEDURE — 93279 PRGRMG DEV EVAL PM/LDLS PM: CPT | Performed by: INTERNAL MEDICINE
